# Patient Record
Sex: MALE | Race: WHITE
[De-identification: names, ages, dates, MRNs, and addresses within clinical notes are randomized per-mention and may not be internally consistent; named-entity substitution may affect disease eponyms.]

---

## 2018-06-24 ENCOUNTER — HOSPITAL ENCOUNTER (OUTPATIENT)
Dept: HOSPITAL 97 - ER | Age: 68
Setting detail: OBSERVATION
LOS: 2 days | Discharge: HOME | End: 2018-06-26
Attending: HOSPITALIST | Admitting: HOSPITALIST
Payer: COMMERCIAL

## 2018-06-24 VITALS — BODY MASS INDEX: 34.9 KG/M2

## 2018-06-24 DIAGNOSIS — K92.1: Primary | ICD-10-CM

## 2018-06-24 DIAGNOSIS — K21.9: ICD-10-CM

## 2018-06-24 DIAGNOSIS — K76.0: ICD-10-CM

## 2018-06-24 DIAGNOSIS — K64.8: ICD-10-CM

## 2018-06-24 DIAGNOSIS — K44.9: ICD-10-CM

## 2018-06-24 DIAGNOSIS — K57.90: ICD-10-CM

## 2018-06-24 DIAGNOSIS — I25.10: ICD-10-CM

## 2018-06-24 DIAGNOSIS — E66.9: ICD-10-CM

## 2018-06-24 DIAGNOSIS — N40.0: ICD-10-CM

## 2018-06-24 DIAGNOSIS — Z79.1: ICD-10-CM

## 2018-06-24 DIAGNOSIS — Z95.5: ICD-10-CM

## 2018-06-24 DIAGNOSIS — I10: ICD-10-CM

## 2018-06-24 LAB
ALBUMIN SERPL BCP-MCNC: 3.5 G/DL (ref 3.4–5)
ALP SERPL-CCNC: 63 U/L (ref 45–117)
ALT SERPL W P-5'-P-CCNC: 40 U/L (ref 12–78)
AST SERPL W P-5'-P-CCNC: 17 U/L (ref 15–37)
BUN BLD-MCNC: 23 MG/DL (ref 7–18)
GLUCOSE SERPLBLD-MCNC: 94 MG/DL (ref 74–106)
HCT VFR BLD CALC: 41.8 % (ref 39.6–49)
HCT VFR BLD CALC: 43.7 % (ref 39.6–49)
INR BLD: 1.04
LIPASE SERPL-CCNC: 135 U/L (ref 73–393)
LYMPHOCYTES # SPEC AUTO: 1.3 K/UL (ref 0.7–4.9)
MAGNESIUM SERPL-MCNC: 2.2 MG/DL (ref 1.8–2.4)
MCH RBC QN AUTO: 30.7 PG (ref 27–35)
MCV RBC: 91.8 FL (ref 80–100)
PMV BLD: 9.4 FL (ref 7.6–11.3)
POTASSIUM SERPL-SCNC: 4.1 MMOL/L (ref 3.5–5.1)
RBC # BLD: 4.76 M/UL (ref 4.33–5.43)

## 2018-06-24 PROCEDURE — 96368 THER/DIAG CONCURRENT INF: CPT

## 2018-06-24 PROCEDURE — 74177 CT ABD & PELVIS W/CONTRAST: CPT

## 2018-06-24 PROCEDURE — 84100 ASSAY OF PHOSPHORUS: CPT

## 2018-06-24 PROCEDURE — 80076 HEPATIC FUNCTION PANEL: CPT

## 2018-06-24 PROCEDURE — 85018 HEMOGLOBIN: CPT

## 2018-06-24 PROCEDURE — 86850 RBC ANTIBODY SCREEN: CPT

## 2018-06-24 PROCEDURE — 36415 COLL VENOUS BLD VENIPUNCTURE: CPT

## 2018-06-24 PROCEDURE — 80053 COMPREHEN METABOLIC PANEL: CPT

## 2018-06-24 PROCEDURE — 85610 PROTHROMBIN TIME: CPT

## 2018-06-24 PROCEDURE — 96365 THER/PROPH/DIAG IV INF INIT: CPT

## 2018-06-24 PROCEDURE — 84484 ASSAY OF TROPONIN QUANT: CPT

## 2018-06-24 PROCEDURE — 99285 EMERGENCY DEPT VISIT HI MDM: CPT

## 2018-06-24 PROCEDURE — 86900 BLOOD TYPING SEROLOGIC ABO: CPT

## 2018-06-24 PROCEDURE — 85014 HEMATOCRIT: CPT

## 2018-06-24 PROCEDURE — 83690 ASSAY OF LIPASE: CPT

## 2018-06-24 PROCEDURE — 71045 X-RAY EXAM CHEST 1 VIEW: CPT

## 2018-06-24 PROCEDURE — 93005 ELECTROCARDIOGRAM TRACING: CPT

## 2018-06-24 PROCEDURE — 86901 BLOOD TYPING SEROLOGIC RH(D): CPT

## 2018-06-24 PROCEDURE — 81003 URINALYSIS AUTO W/O SCOPE: CPT

## 2018-06-24 PROCEDURE — 80048 BASIC METABOLIC PNL TOTAL CA: CPT

## 2018-06-24 PROCEDURE — 85025 COMPLETE CBC W/AUTO DIFF WBC: CPT

## 2018-06-24 PROCEDURE — 83735 ASSAY OF MAGNESIUM: CPT

## 2018-06-24 PROCEDURE — 85730 THROMBOPLASTIN TIME PARTIAL: CPT

## 2018-06-24 RX ADMIN — SODIUM CHLORIDE SCH: 0.9 INJECTION, SOLUTION INTRAVENOUS at 21:00

## 2018-06-24 RX ADMIN — SODIUM CHLORIDE SCH MLS: 0.9 INJECTION, SOLUTION INTRAVENOUS at 22:04

## 2018-06-24 RX ADMIN — Medication SCH ML: at 22:06

## 2018-06-24 NOTE — EDPHYS
Physician Documentation                                                                           

 [facility]                                                                

Name: Marlen Lee                                                                              

Age: 67 yrs                                                                                       

Sex: Male                                                                                         

: 1950                                                                                   

MRN: [id number]                                                                                   

Arrival Date: 2018                                                                          

Time: 15:27                                                                                       

Account#: [id number]                                                                            

Bed 8                                                                                             

Private MD: Kirk Pacheco B                                                                     

ED Physician Lance Martinez                                                                      

HPI:                                                                                              

                                                                                             

15:56 This 67 yrs old  Male presents to ER via Ambulatory with complaints of Rectal  cp  

      Bleeding.                                                                                   

15:56 The patient presents to the emergency department with bleeding from the rectum/anus.    cp  

      Onset: The symptoms/episode began/occurred this morning. Associate signs and symptoms:      

      Pertinent negatives: abdominal pain, constipation, diarrhea, fever, vomiting. Patient       

      reports 4 episodes of painless rectal bleeding since this morning. Blood appears dark       

      colored.                                                                                    

                                                                                                  

Historical:                                                                                       

- Allergies:                                                                                      

15:39 NKA;                                                                                    aa5 

- Home Meds:                                                                                      

20:04 aspirin 81 mg Oral chew 1 tab once daily [Active]; brimonidine 0.2% sol fraga 2 drops  lp1 

      to each eye twice a day [Active]; Centrum Oral [Active]; dicyclomine 20 mg Oral tab as      

      needed [Active]; finasteride 5 mg Oral tab 1 tab once daily [Active]; gabapentin 600 mg     

      Oral tab as needed [Active]; lansoprozole 30 mg as needed [Active]; metformin 500 mg        

      Oral tr24 1 tab twice a day [Active]; Probiotic Oral [Active]; Repatha SureClick 140        

      mg/mL subcutaneous pnij 3 mL once moly [Active]; tamsulosin 0.4 mg Oral cp24 1 cap          

      Every other day [Active]; telmisartan-amlodipine 80-10 mg Oral tab 1 tab once daily         

      [Active]; tindol maleate [Active]; zolpidem 10 mg Oral tab 1 tab as needed [Active];        

- PMHx:                                                                                           

15:39 Diverticulitis; GERD; Glaucoma; Hypertension; Kidney stones; prediabetes;               aa5 

- PSHx:                                                                                           

15:39 cardiac stent; cataract- richard eyes;                                                      aa5 

                                                                                                  

- Immunization history:: Adult Immunizations unknown.                                             

- Social history:: Smoking status: Patient/guardian denies using tobacco.                         

- Ebola Screening: : No symptoms or risks identified at this time.                                

                                                                                                  

                                                                                                  

ROS:                                                                                              

16:00 Constitutional: Negative for body aches, chills, fever, poor PO intake.                 cp  

16:00 Eyes: Negative for injury, pain, redness, and discharge.                                cp  

16:00 ENT: Negative for drainage from ear(s), ear pain, sore throat, difficulty swallowing,       

      difficulty handling secretions.                                                             

16:00 Cardiovascular: Negative for chest pain, edema.                                             

16:00 Respiratory: Negative for cough, shortness of breath, wheezing.                             

16:00 Abdomen/GI: Positive for rectal bleeding, Negative for abdominal pain, vomiting,            

      diarrhea, constipation.                                                                     

16:00 Back: Negative for pain at rest, pain with movement, radiated pain.                         

16:00 : Negative for urinary symptoms, testicular pain                                          

16:00 Skin: Negative for cellulitis, rash.                                                        

16:00 Neuro: Negative for altered mental status, headache, syncope, near syncope, weakness.       

16:00 All other systems are negative.                                                             

                                                                                                  

Exam:                                                                                             

16:08 Constitutional: The patient appears in no acute distress, alert, awake, comfortable,    cp  

      non-diaphoretic, non-toxic, well developed, well nourished.                                 

16:08 Head/Face:  Normocephalic, atraumatic.                                                  cp  

17:38 ECG was reviewed by the Attending Physician.                                              

                                                                                                  

Vital Signs:                                                                                      

15:39  / 74; Pulse 65; Resp 16 S; Temp 98.2(TE); Pulse Ox 95% on R/A; Weight 111.13 kg  aa5 

      (R); Height 5 ft. 8 in. (172.72 cm) (R); Pain 3/10;                                         

16:29  / 75; Pulse 61; Resp 16 S; Pulse Ox 97% on R/A; Pain 0/10;                       jl7 

19:17  / 76; Pulse 59; Resp 18; Temp 98.0(O); Pulse Ox 98% on R/A;                      lp1 

20:20  / 61; Pulse 60; Resp 18; Pulse Ox 99% on R/A;                                    lp1 

15:39 Body Mass Index 37.25 (111.13 kg, 172.72 cm)                                            aa5 

                                                                                                  

MDM:                                                                                              

15:43 Patient medically screened.                                                             cp  

18:35 Data reviewed: vital signs, nurses notes, lab test result(s), EKG, radiologic studies,  cp  

      CT scan, plain films.                                                                       

18:35 Test interpretation: by ED physician or midlevel provider: ECG, plain radiologic        cp  

      studies.                                                                                    

18:44 Physician consultation: Azael Benson MD was called at 18:30, regarding patient's      cp  

      condition, left message on voicemail.                                                       

19:07 Physician consultation: Azael Benson MD was called at 19:07, regarding consult,       cp  

      patient's condition, left message on voicemail.                                             

19:32 Physician consultation: Isiah Crow MD was called at 19:25, was contacted at 19:25,     cp  

      regarding consult, patient's condition, would like admission per Dr. Eliana Espinoza MD.      

                                                                                                  

                                                                                             

16:12 Order name: Basic Metabolic Panel; Complete Time: 17:06                                 cp  

                                                                                             

17:06 Interpretation: Normal except: BUN 23; GFR 84.                                          cp  

                                                                                             

16:12 Order name: CBC with Diff; Complete Time: 17:06                                         cp  

                                                                                             

17:07 Interpretation: Normal except: MN% 13.5.                                                  

                                                                                             

16:12 Order name: LFT's; Complete Time: 17:06                                                   

                                                                                             

16:12 Order name: Magnesium; Complete Time: 17:06                                             cp  

                                                                                             

16:12 Order name: PT-INR; Complete Time: 17:06                                                  

                                                                                             

16:12 Order name: Ptt, Activated; Complete Time: 17:06                                        cp  

                                                                                             

16:12 Order name: Troponin (emerg Dept Use Only); Complete Time: 17:06                        cp  

                                                                                             

16:12 Order name: XRAY Chest (1 view); Complete Time: 18:30                                   cp  

                                                                                             

16:12 Order name: Type And Screen; Complete Time: 17:17                                       cp  

                                                                                             

17:17 Interpretation: Reviewed.                                                                 

                                                                                             

16:12 Order name: Lipase; Complete Time: 17:06                                                cp  

                                                                                             

17:59 Order name: ABO/RH no charge; Complete Time: 18:30                                      EDMS

                                                                                             

19:20 Order name: Hemoglobin; Complete Time: 20:03                                              

                                                                                             

20:03 Interpretation: Within normal limits.                                                     

                                                                                             

19:20 Order name: Hematocrit; Complete Time: 20:03                                            fc  

                                                                                             

19:23 Order name: Urine Dipstick--Ancillary (enter results); Complete Time: 20:03             rg2 

                                                                                             

16:12 Order name: EKG; Complete Time: 16:12                                                   cp  

                                                                                             

16:12 Order name: Cardiac monitoring; Complete Time: 16:23                                    cp  

                                                                                             

16:12 Order name: EKG - Nurse/Tech; Complete Time: 18:47                                        

                                                                                             

16:12 Order name: IV Saline Lock; Complete Time: 16:23                                          

                                                                                             

16:12 Order name: Labs collected and sent; Complete Time: 16:23                                 

                                                                                             

16:12 Order name: O2 Per Protocol; Complete Time: 16:23                                         

                                                                                             

16:12 Order name: O2 Sat Monitoring; Complete Time: 16:23                                       

                                                                                             

16:12 Order name: Urine Dipstick-Ancillary (obtain specimen); Complete Time: 19:22              

                                                                                             

16:12 Order name: CT Abd/Pelvis - W/Contrast; Complete Time: 18:30                              

                                                                                             

18:30 Interpretation: Report reviewed.                                                          

                                                                                             

18:56 Order name: NPO; Complete Time: 19:17                                                     

                                                                                             

19:40 Order name: CONS Physician Consult                                                      EDMS

                                                                                                  

EC:38 Rate is 54 beats/min. Rhythm is regular. NJ interval is normal. QRS interval is normal. cp  

      QT interval is normal. No ST changes noted. Interpreted by me. Reviewed by me.              

                                                                                                  

Administered Medications:                                                                         

19:25 Drug: metroNIDAZOLE 500 mg Volume: 100 ml; Route: IVPB; Infused Over: 30 mins; Site:    lp1 

      right antecubital;                                                                          

20:26 Follow up: IV Status: Completed infusion                                                lp1 

19:25 Drug: Cipro 400 mg Volume: 200 ml; Route: IVPB; Infused Over: 60 mins; Site: right      lp1 

      antecubital;                                                                                

20:26 Follow up: IV Status: Completed infusion                                                lp1 

                                                                                                  

                                                                                                  

Disposition:                                                                                      

                                                                                             

16:17 Co-signature as Attending Physician, Lance Martinez MD I agree with the assessment and  [facility] 

      plan of care.                                                                               

                                                                                                  

Disposition:                                                                                      

18 19:35 Hospitalization ordered by Eliana Espinoza for Observation. Preliminary             

  diagnosis is Diverticulosis of large intestine without perforation or abscess                   

  with bleeding.                                                                                  

- Bed requested for Telemetry/MedSurg (observation).                                              

- Status is Observation.                                                                      lp1 

- Condition is Stable.                                                                            

- Problem is new.                                                                                 

- Symptoms are unchanged.                                                                         

UTI on Admission? No                                                                              

                                                                                                  

                                                                                                  

                                                                                                  

Signatures:                                                                                       

Dispatcher [employer]                           EDMS                                                 

Junie Schmidt RN RN mw Anderson, Corey, MD MD cha Calderon, Audri, RN                     RN   aa5                                                  

Lizzeth Rai RN                         RN   lp1                                                  

Page, Lance, PA                         PA   cp                                                   

                                                                                                  

Corrections: (The following items were deleted from the chart)                                    

                                                                                             

19:48 19:35 Hospitalization Ordered by Eliana Espinoza MD for Observation. Preliminary          mw  

      diagnosis is Diverticulosis of large intestine without perforation or abscess with          

      bleeding. Bed requested for Telemetry/MedSurg (observation). Status is Observation.         

      Condition is Stable. Problem is new. Symptoms are unchanged. UTI on Admission? No. cp       

20:07 19:48 2018 19:35 Hospitalization Ordered by Eliana Espinoza MD for Observation.     mw  

      Preliminary diagnosis is Diverticulosis of large intestine without perforation or           

      abscess with bleeding. Bed requested for Telemetry/MedSurg (observation). Status is         

      Observation. Condition is Stable. Problem is new. Symptoms are unchanged. UTI on            

      Admission? No. mw                                                                           

20:41 20:07 2018 19:35 Hospitalization Ordered by Eliana Espinoza MD for Observation.     lp1 

      Preliminary diagnosis is Diverticulosis of large intestine without perforation or           

      abscess with bleeding. Bed requested for Telemetry/MedSurg (observation). Status is         

      Observation. Condition is Stable. Problem is new. Symptoms are unchanged. UTI on            

      Admission? No. mw                                                                           

                                                                                                  

**************************************************************************************************

## 2018-06-24 NOTE — ER
Nurse's Notes                                                                                     

 [facility]                                                                

Name: Marlen Lee                                                                              

Age: 67 yrs                                                                                       

Sex: Male                                                                                         

: 1950                                                                                   

MRN: [id number]                                                                                   

Arrival Date: 2018                                                                          

Time: 15:27                                                                                       

Account#: [id number]                                                                            

Bed 8                                                                                             

Private MD: Kirk Pacheco B                                                                     

Diagnosis: Diverticulosis of large intestine without perforation or abscess with bleeding         

                                                                                                  

Presentation:                                                                                     

                                                                                             

15:37 Presenting complaint: Patient states: rectal bleeding that started as bright red this   aa5 

      morning around 1000 and it's now dark red. Pt denies nausea, denies vomiting. Pt states     

      "I know I have an ulcer but it's never bleed before". Transition of care: patient was       

      not received from another setting of care. Onset of symptoms was 2018. Risk            

      Assessment: Do you want to hurt yourself or someone else? Patient reports no desire to      

      harm self or others. Initial Sepsis Screen: Does the patient meet any 2 criteria? No.       

      Patient's initial sepsis screen is negative. Does the patient have a suspected source       

      of infection? No. Patient's initial sepsis screen is negative. Care prior to arrival:       

      None.                                                                                       

15:37 Method Of Arrival: Ambulatory                                                           aa5 

15:37 Acuity: ADRIANNA 3                                                                           aa5 

                                                                                                  

Historical:                                                                                       

- Allergies:                                                                                      

15:39 NKA;                                                                                    aa5 

- Home Meds:                                                                                      

20:04 aspirin 81 mg Oral chew 1 tab once daily [Active]; brimonidine 0.2% sol fraga 2 drops  lp1 

      to each eye twice a day [Active]; Centrum Oral [Active]; dicyclomine 20 mg Oral tab as      

      needed [Active]; finasteride 5 mg Oral tab 1 tab once daily [Active]; gabapentin 600 mg     

      Oral tab as needed [Active]; lansoprozole 30 mg as needed [Active]; metformin 500 mg        

      Oral tr24 1 tab twice a day [Active]; Probiotic Oral [Active]; Repatha SureClick 140        

      mg/mL subcutaneous pnij 3 mL once moly [Active]; tamsulosin 0.4 mg Oral cp24 1 cap          

      Every other day [Active]; telmisartan-amlodipine 80-10 mg Oral tab 1 tab once daily         

      [Active]; tindol maleate [Active]; zolpidem 10 mg Oral tab 1 tab as needed [Active];        

- PMHx:                                                                                           

15:39 Diverticulitis; GERD; Glaucoma; Hypertension; Kidney stones; prediabetes;               aa5 

- PSHx:                                                                                           

15:39 cardiac stent; cataract- richard eyes;                                                      aa5 

                                                                                                  

- Immunization history:: Adult Immunizations unknown.                                             

- Social history:: Smoking status: Patient/guardian denies using tobacco.                         

- Ebola Screening: : No symptoms or risks identified at this time.                                

                                                                                                  

                                                                                                  

Screening:                                                                                        

15:45 Abuse screen: Denies threats or abuse. Denies injuries from another. Nutritional        jl7 

      screening: No deficits noted. Tuberculosis screening: No symptoms or risk factors           

      identified. Fall Risk IV access (20 points). Total Smith Fall Scale indicates No Risk       

      (0-24 pts).                                                                                 

                                                                                                  

Assessment:                                                                                       

15:45 General: Appears in no apparent distress. uncomfortable, Behavior is calm, cooperative, jl7 

      appropriate for age. Pain: Denies pain. Neuro: Level of Consciousness is awake, alert,      

      obeys commands, Oriented to person, place, time, situation. Cardiovascular: Heart tones     

      S1 S2 present Patient's skin is warm and dry. Respiratory: Airway is patent Respiratory     

      effort is even, unlabored, Respiratory pattern is regular, symmetrical. GI: Abdomen is      

      round non-distended, Bowel sounds present X 4 quads. Abd is soft and non tender X 4         

      quads. Reports rectal bleeding, since since this morning. : No signs and/or symptoms      

      were reported regarding the genitourinary system. EENT: No signs and/or symptoms were       

      reported regarding the EENT system. Derm: Skin is pink, warm \T\ dry.                       

19:17 Reassessment: Patient is alert, oriented x 3, equal unlabored respirations, skin        lp1 

      warm/dry/pink. Reassessment: Verbal order from provider for repeat Hgb/Hct. GI: Reports     

      rectal bleeding, States up to bathroom multiple times here in ED, "I feel like I'm          

      getting weaker";.                                                                           

20:21 Reassessment: Patient appears in no apparent distress at this time. Patient and/or      lp1 

      family updated on plan of care and expected duration. Pain level reassessed. Patient is     

      alert, oriented x 3, equal unlabored respirations, skin warm/dry/pink. Patient and wife     

      aware of admission.                                                                         

20:25 Reassessment: Attempt to call report at this time.                                      lp1 

                                                                                                  

Vital Signs:                                                                                      

15:39  / 74; Pulse 65; Resp 16 S; Temp 98.2(TE); Pulse Ox 95% on R/A; Weight 111.13 kg  aa5 

      (R); Height 5 ft. 8 in. (172.72 cm) (R); Pain 3/10;                                         

16:29  / 75; Pulse 61; Resp 16 S; Pulse Ox 97% on R/A; Pain 0/10;                       jl7 

19:17  / 76; Pulse 59; Resp 18; Temp 98.0(O); Pulse Ox 98% on R/A;                      lp1 

20:20  / 61; Pulse 60; Resp 18; Pulse Ox 99% on R/A;                                    lp1 

15:39 Body Mass Index 37.25 (111.13 kg, 172.72 cm)                                            aa5 

                                                                                                  

ED Course:                                                                                        

15:27 Patient arrived in ED.                                                                  mr  

15:28 Kirk Pacheco MD is Private Physician.                                                mr  

15:38 Triage completed.                                                                       aa5 

15:39 Arm band placed on.                                                                     aa5 

15:42 Luis Eduardo Lewis RN is Primary Nurse.                                                      jl7 

15:42 Lance Campos PA is PHCP.                                                                cp  

15:43 Lance Martinez MD is Attending Physician.                                             cp  

15:45 Patient has correct armband on for positive identification. Placed in gown. Bed in low  jl7 

      position. Call light in reach. Side rails up X 1. Cardiac monitor on. Pulse ox on. NIBP     

      on. Warm blanket given.                                                                     

15:50 Served as a chaperone during rectal exam.                                               jl7 

15:55 Initial lab(s) drawn, by me, sent to lab. Inserted saline lock: 20 gauge in right       jl7 

      antecubital area, using aseptic technique. Blood collected.                                 

16:28 X-ray completed. Portable x-ray completed in exam room. Patient tolerated procedure     la2 

      well.                                                                                       

16:30 XRAY Chest (1 view) In Process Unspecified.                                             EDMS

18:17 CT completed. Patient moved to CT via wheelchair. Patient moved back from CT.           cw1 

18:18 CT Abd/Pelvis - W/Contrast In Process Unspecified.                                      EDMS

19:05 Urine collected: clean catch specimen.                                                  cc  

19:10 Report given to MILKA Moreau.                                                              jl7 

19:23 Primary Nurse role handed off by Luis Eduardo Lewis RN                                       rg2 

19:24 Lizzeth Rai RN is Primary Nurse.                                                       lp1 

19:35 Eliana Espinoza MD is Hospitalizing Provider.                                           cp  

20:02 Patient admitted, IV remains in place.                                                  lp1 

                                                                                                  

Administered Medications:                                                                         

19:25 Drug: metroNIDAZOLE 500 mg Volume: 100 ml; Route: IVPB; Infused Over: 30 mins; Site:    lp1 

      right antecubital;                                                                          

20:26 Follow up: IV Status: Completed infusion                                                lp1 

19:25 Drug: Cipro 400 mg Volume: 200 ml; Route: IVPB; Infused Over: 60 mins; Site: right      lp1 

      antecubital;                                                                                

20:26 Follow up: IV Status: Completed infusion                                                lp1 

                                                                                                  

                                                                                                  

Outcome:                                                                                          

19:35 Decision to Hospitalize by Provider.                                                    cp  

20:04 Condition: stable                                                                       lp1 

20:04 Instructed on the need for admit.                                                       lp1 

20:33 Admitted to Tele via wheelchair, room 415, with chart, Report called to  Nora Zepeda RN lp1 

20:41 Patient left the ED.                                                                    lp1 

                                                                                                  

Signatures:                                                                                       

Dispatcher [employer]                           EDMS                                                 

Ayo An                               rg2                                                  

Nora Peterson                                mr                                                   

[place], RN                     RN   aa5                                                  

Hillary Camarena                             cw1                                                  

Ivory Haynes Laura RN                         RN   lp1                                                  

Lance Campos, PA                         PA   Luis Eduardo Bentley RN                        RN   jl7                                                  

India Herrera                               la2                                                  

                                                                                                  

Corrections: (The following items were deleted from the chart)                                    

15:39 15:37 Presenting complaint: Patient states: rectal bleeding that started as bright red  aa5 

      this morning around 1000 and it's now dark red. Pt denies nausea, denies vomiting. Pt       

      states "I know I have an ulcer" aa5                                                         

                                                                                                  

**************************************************************************************************

## 2018-06-24 NOTE — RAD REPORT
EXAM DESCRIPTION:  RAD - Chest Single View - 6/24/2018 4:29 pm

 

CLINICAL HISTORY:  Cough, shortness of breath

 

COMPARISON:  January 2018

 

TECHNIQUE:  AP portable chest image was obtained 1617 hours .

 

FINDINGS:  Lungs are clear. Heart and vasculature are normal. No measurable pleural effusion and no p
neumothorax. No gross bony abnormality seen. No acute aortic findings suspected.

 

IMPRESSION:  No acute cardiopulmonary process.

 

No significant change from comparison.

## 2018-06-24 NOTE — RAD REPORT
EXAM DESCRIPTION:  CT - Abdomen   Pelvis W Contrast - 6/24/2018 6:17 pm

 

CLINICAL HISTORY:  Rectal bleeding, history of diverticulitis.

 

COMPARISON:  CT October 2017

 

TECHNIQUE:  Biphasic, helical CT imaging of the abdomen and pelvis was performed following 100 ml non
-ionic IV contrast. Oral contrast was given.

 

All CT scans are performed using dose optimization technique as appropriate and may include automated
 exposure control or mA/KV adjustment according to patient size.

 

FINDINGS:  No suspicious findings in the lung bases.

 

The liver, spleen, and pancreas show no suspicious findings. Liver shows diffuse fatty infiltration. 
No gallbladder or biliary tree abnormality.

 

Symmetric renal function is seen with no hydronephrosis or suspicious renal mass. No pyelonephritis o
r acute renal parenchymal process. No adrenal abnormalities. Urinary bladder is contracted limiting d
etail. No significant prostate or seminal vesicle finding.

 

No dilated bowel loops or bowel wall thickening. Small duodenal diverticulum. No appendicitis. There 
is prominent diverticulosis in the sigmoid colon but no diverticulitis. No mass or focal abnormality 
as a source for rectal bleeding. No free air, free fluid or inflammatory stranding.  No mass or bulky
 lymphadenopathy. Small fat only umbilical hernia is present. Patient has a small fat only left ingui
nal hernia as well as a fatty hiatal hernia.

 

Advanced disc and bony degenerative change present. No acute or pathologic bone process.

 

 

IMPRESSION:  Diverticulosis without diverticulitis, mass or other acute colon process. No abnormality
 identified as a source for GI bleed.  No appendicitis findings. Full colon assessment is limited due
 to incomplete distension.

 

Diffuse fatty infiltration of the liver.

 

No acute  finding. Additional nonacute findings detailed in the body of the report. Overall no sign
ificant change from comparison.

## 2018-06-25 LAB
ALBUMIN SERPL BCP-MCNC: 3.2 G/DL (ref 3.4–5)
ALP SERPL-CCNC: 53 U/L (ref 45–117)
ALT SERPL W P-5'-P-CCNC: 35 U/L (ref 12–78)
AST SERPL W P-5'-P-CCNC: 18 U/L (ref 15–37)
BUN BLD-MCNC: 17 MG/DL (ref 7–18)
GLUCOSE SERPLBLD-MCNC: 104 MG/DL (ref 74–106)
HCT VFR BLD CALC: 37.5 % (ref 39.6–49)
HCT VFR BLD CALC: 38.2 % (ref 39.6–49)
HCT VFR BLD CALC: 40 % (ref 39.6–49)
HCT VFR BLD CALC: 41.4 % (ref 39.6–49)
LYMPHOCYTES # SPEC AUTO: 1.1 K/UL (ref 0.7–4.9)
LYMPHOCYTES # SPEC AUTO: 1.2 K/UL (ref 0.7–4.9)
MAGNESIUM SERPL-MCNC: 1.9 MG/DL (ref 1.8–2.4)
MCH RBC QN AUTO: 30.4 PG (ref 27–35)
MCH RBC QN AUTO: 30.7 PG (ref 27–35)
MCV RBC: 92.9 FL (ref 80–100)
MCV RBC: 93.2 FL (ref 80–100)
PMV BLD: 8.9 FL (ref 7.6–11.3)
PMV BLD: 8.9 FL (ref 7.6–11.3)
POTASSIUM SERPL-SCNC: 4.1 MMOL/L (ref 3.5–5.1)
RBC # BLD: 4.03 M/UL (ref 4.33–5.43)
RBC # BLD: 4.29 M/UL (ref 4.33–5.43)

## 2018-06-25 RX ADMIN — SODIUM CHLORIDE SCH MLS: 0.9 INJECTION, SOLUTION INTRAVENOUS at 17:52

## 2018-06-25 RX ADMIN — Medication SCH ML: at 08:24

## 2018-06-25 RX ADMIN — SODIUM CHLORIDE SCH MLS: 0.9 INJECTION, SOLUTION INTRAVENOUS at 08:24

## 2018-06-25 RX ADMIN — SODIUM CHLORIDE SCH MLS: 0.9 INJECTION, SOLUTION INTRAVENOUS at 08:23

## 2018-06-25 RX ADMIN — Medication SCH: at 21:00

## 2018-06-25 NOTE — CON
Date of Consultation:  06/25/2018



Brief History Of Present Illness:  The patient is a 67-year-old  male, who presents to [facility] with rectal bleeding starting approximately 24 hours ago.  He states, there was a large quantity 
of blood.  It was predominantly dark and melanotic.  He had been taking Aleve for the last few hours,
 more so than usual.  He had a colonoscopy in the past, which revealed internal hemorrhoids and diver
ticulosis.  He does take a baby aspirin as well.  His vital signs were good.  On admission, his hemog
lobin was checked and found to be in the normal range.  He has no pain, no nausea, no vomiting, no ot
her history of rectal bleeding.  His last colonoscopy was this year as described above.  He has had n
o sick contacts, no recent travel, no new food exposures.



Past Medical History:  Significant for hypertension, glaucoma, GERD, hiatal hernia, diverticulosis, n
ephrolithiasis, BPH, and coronary artery disease.



Past Surgical History:  Coronary stents.



Family History:  Reviewed and noncontributory.



Social History:  He denies smoking, alcohol, or recreational drug use.



Allergies:  TO NO KNOWN DRUG ALLERGIES.



Home Medications:  Include aspirin, Flomax, Ambien, Protonix, telmisartan, amlodipine, timolol.



Review of Systems:

A 10-point review of systems other than HPI, denies.



Physical Examination:

Vital Signs:  At the time of my examination, his BMI is 35.4.  His blood pressure 146/70, heart rate 
is 54, respiratory rate 20, and temperature 97.3. 

General:  He is awake, alert, oriented. 

Psychiatric:  He is appropriate and conversive. 

HEENT:  Normocephalic.  Sclerae anicteric.  Mucous membranes are moist.  Oropharynx clear. 

Neck:  Supple.  No JVD. 

Chest:  Normal expansion and excursion. 

Cardiovascular:  Regular rate and rhythm. 

Pulmonary:  Clear to auscultation bilaterally. 

Abdomen:  Soft, nontender, nondistended.  No rebound.  No guarding.  No focal peritonitis.  His abdom
en is generally obese. 

Rectal:  Shows some evidence of dark melanotic blood streaking.  He does have internal hemorrhoids in
 both lateral pillars, right anterior posterior and left lateral pillar.  There is no bright red bloo
d on examination.  There are no external hemorrhoids appreciated.  It is nontender to examination and
 tone is generally good. 

Extremities:  No clubbing, cyanosis, or edema. 

Skin:  Warm and dry.



Laboratory Data:  Reveals a white blood count of 4.3, hemoglobin is 13.2 over hematocrit of 40.0, and
 platelet count is 199.  His sodium was 139, potassium 4.1, chloride 107, carbon dioxide 28, BUN 23, 
creatinine 0.9, glucose is 94, calcium 8.7, magnesium 2.2, total bilirubin 0.4.  Direct troponin 0.1.
  AST 17, ALT 40, and alkaline phosphatase is 63.  UA is essentially negative.  He had imaging perfor
med, which included a CT scan of the abdomen and pelvis, which showed evidence of diverticulosis with
out diverticulitis.  No other acute findings for GI bleed.  No appendicitis findings.  Diffuse fatty 
infiltration of the liver.  He does have an umbilical hernia and a small fat-containing left inguinal
 hernia as well as a fatty hiatal hernia.  He had a chest x-ray performed as well, which is officiall
y read as no acute cardiopulmonary process.



Assessment And Plan:  This is a 67-year-old male, who presents with gastrointestinal bleeding

1.IV fluid hydration.

2.The patient had type and screen.

3.I recommend a GI consultation for possible endoscopy.  Dr. Edgar has been consulted to see the maryann
ent.

4.The patient does have hemorrhoids, however, this is unlikely the source of the acute bleed as it i
s melanotic and not bright red.  However, it is not completely ruled out at this time.  Therefore, I 
will wait until endoscopy findings to proceed with further examination and evaluation.  I have explai
manju the risks, benefits, 

and alternatives of the above stated plan.  The patient agrees to proceed as indicated.





ALEXANDER/PHIL

DD:  06/25/2018 08:56:40Voice ID:  [id number]

DT:  06/25/2018 12:57:05Report ID:  [id number]

## 2018-06-25 NOTE — P.HP
Certification for Inpatient


Patient admitted to: Observation


With expected LOS: <2 Midnights


Patient will require the following post-hospital care: None


Practitioner: I am a practitioner with admitting privileges, knowledge of 

patient current condition, hospital course, and medical plan of care.


Services: Services provided to patient in accordance with Admission 

requirements found in Title 42 Section 412.3 of the Code of Federal Regulations





Patient History


Date of Service: 06/24/18


Reason for admission: Rectal bleeding


History of Present Illness: 





Patient is a 67-year-old gentleman who came into the hospital with rectal 

bleeding.  Patient states it has been going on for the last 6-8 hr.  He states 

that is a large quantity of blood.  He denies any melanotic stools.  He has 

been taking Aleve for the last few weeks little more so than normal.  He thinks 

about 1 every other day.  He could have collagenous colitis or a hemorrhoidal 

bleed as he has had a colonoscopy in the past which revealed internal 

hemorrhoids.  He takes a baby aspirin daily as well.  His vital signs are 

stable and he has no orthostatics.  His hemoglobin on repeat has not drop 

significantly.  He does not have any tachyarrhythmia.  We did not have GI on 

call but we did contact Dr. Crow who is available for any procedures as 

needed.  Patient sees Dr. Edgar will be consulted as well.


Allergies





No Known Allergies Allergy (Unverified 09/10/12 11:36)


 





Home Medications: 








Aspirin [Aspirin EC 81 MG] 81 mg PO DAILY 10/06/17 


Tamsulosin [Flomax*] 0.4 mg PO SEECOM 10/06/17 


Zolpidem Tartrate 10 mg PO BEDTIME PRN 10/06/17 


Pantoprazole [Protonix  Tab] 40 mg PO DAILY #30 tab 10/07/17 


Telmisartan/Amlodipine [Telmisartan-Amlodipine 80-10] 1 tab PO DAILY 06/24/18 


Timolol 0.5% Opth [Timoptic 0.5% Opth*] 1 drop EACH EYE BID 06/24/18 








- Past Medical/Surgical History


Has patient received pneumonia vaccine in the past: Yes


Diabetic: No


-: History of diverticulitis


-: GERD with hiatal hernia


-: Hypertension


-: Glaucoma


-: Nephrolithiasis


-: BPH


-: Coronary artery disease, previous stent


Past Surgical History: Patient denies surgical history


-: cardiac stent


Psychosocial/ Personal History: The patient is .





- Family History


  ** Mother


Medical History: Other (see notes)


Notes: Glaucoma, Chrons disease





- Social History


Smoking Status: Never smoker


Alcohol use: No


CD- Drugs: No


Caffeine use: Yes


Place of Residence: Home





Review of Systems


10-point ROS is otherwise unremarkable





Physical Examination





- Vital Signs


Temperature: 97.3 F


Blood Pressure: 146/70


Pulse: 57


Respirations: 20


Pulse Ox (%): 94





- Physical Exam


General: Alert, In no apparent distress, Oriented x3


HEENT: Atraumatic, PERRLA, Mucous membr. moist/pink, EOMI, Sclerae nonicteric


Neck: Supple, 2+ carotid pulse no bruit, No LAD, Without JVD or thyroid 

abnormality


Respiratory: Clear to auscultation bilaterally, Normal air movement


Cardiovascular: Regular rate/rhythm, Normal S1 S2, No murmurs


Gastrointestinal: Normal bowel sounds, Soft and benign, Non-distended, No 

tenderness


Musculoskeletal: No clubbing, No swelling, No tenderness


Integumentary: No rashes


Neurological: Normal gait, Normal speech, Normal strength at 5/5 x4 extr, 

Normal tone, Sensation intact, Cranial nerves 3-12 intact, Normal reflexes 2+, 

Normal affect


Lymphatics: No axilla or inguinal lymphadenopathy





- Studies


Laboratory Data (last 24 hrs)





06/24/18 19:20: Hgb 14.4, Hct 41.8


06/24/18 15:55: PT 12.3, INR 1.04, APTT 32.3


06/24/18 15:55: WBC 4.6, Hgb 14.6, Hct 43.7, Plt Count 192


06/24/18 15:55: Sodium 139, Potassium 4.1, BUN 23 H, Creatinine 0.90, Glucose 94

, Magnesium 2.2, Total Bilirubin 0.4, AST 17, ALT 40, Alkaline Phosphatase 63, 

Lipase 135








Assessment & Plan





- Problems (Diagnosis)


(1) Lower GI bleeding


Current Visit: Yes   Status: Acute   





(2) NSAID long-term use


Current Visit: Yes   Status: Acute   





(3) BPH (benign prostatic hyperplasia)


Current Visit: No   Status: Chronic   


Qualifiers: 


 





(4) CAD (coronary artery disease)


Current Visit: No   Status: Chronic   


Qualifiers: 


 





(5) Hypertension


Current Visit: No   Status: Chronic   


Qualifiers: 


 





(6) Obesity


Current Visit: No   Status: Chronic   


Qualifiers: 


 





- Plan





Plan:


1. Continue with IV hydration and PPI drip


2. Refrain from NSAID use


3. Continue with pain control


4. NPO


5. GI consultation in general surgery consultation


6. Serial H&H, and we will monitor electrolytes as well


7. GI and DVT prophylaxis


Discharge Plan: Home


Plan to discharge in: 48 Hours





- Advance Directives


Does patient have a Living Will: No


Does patient have a Durable POA for Healthcare: No





- Code Status/Comfort Care


Code Status Assessed: Yes


Code Status: Full Code


Critical Care: No


Time Spent Managing PTS Care (In Minutes): 50

## 2018-06-25 NOTE — EKG
Test Date:    2018-06-24               Test Time:    22:51:39

Technician:   RT BOX                                   

                                                     

MEASUREMENT RESULTS:                                       

Intervals:                                           

Rate:         48                                     

NJ:           164                                    

QRSD:         90                                     

QT:           480                                    

QTc:          428                                    

Axis:                                                

P:            63                                     

NJ:           164                                    

QRS:          -18                                    

T:            7                                      

                                                     

INTERPRETIVE STATEMENTS:                                       

                                                     

Marked sinus bradycardia

Minimal voltage criteria for LVH, may be normal variant

Abnormal ECG

Compared to ECG 06/24/2018 17:31:28

No significant changes



Electronically Signed On 06-25-18 06:51:16 CDT by Benja Casper

## 2018-06-25 NOTE — EKG
Test Date:    2018-06-24               Test Time:    17:31:28

Technician:   AMG                                    

                                                     

MEASUREMENT RESULTS:                                       

Intervals:                                           

Rate:         54                                     

UT:           154                                    

QRSD:         92                                     

QT:           446                                    

QTc:          422                                    

Axis:                                                

P:            53                                     

UT:           154                                    

QRS:          -27                                    

T:            18                                     

                                                     

INTERPRETIVE STATEMENTS:                                       

                                                     

Sinus bradycardia

Minimal voltage criteria for LVH, may be normal variant

Borderline ECG

Compared to ECG 10/06/2017 10:02:59

Left ventricular hypertrophy now present

Sinus rhythm no longer present

Left-axis deviation no longer present



Electronically Signed On 06-25-18 06:52:22 CDT by Benja Casper

## 2018-06-25 NOTE — PN
Date of Progress Note:  06/25/2018



Subjective:  The patient seen and examined, chart reviewed, and case discussed 
with RN and Dr. Crow and Dr. Edgar.  The patient is still having some lower 
GI bleed, bright red blood per rectum.  Denies any dizziness or lightheadedness.



Review of Systems:

Negative except as above.



Medications:  Reviewed.



Objective:  Vital Signs: Temperature 99, heart rate 75, blood pressure 126/60, 
respirations 18, and O2 95% on room air. 

General:  Awake, alert, oriented x3, in some mild distress.  Elderly male.  
Obese.  BMI 35. 

CV:  S1, S2.  No murmurs.  Regular rate and rhythm.  Peripheral pulses present. 

Respiratory:  Clear to auscultation bilaterally.  No wheezing.  No stridor.  No 
use of accessory muscles Gastrointestinal:  Abdomen is soft, nontender, 
nondistended.  Positive bowel sounds. 

Extremities:  No clubbing, cyanosis, edema. 

Neurologic:  Nonfocal.



Laboratory Data:  Sodium 138, potassium 4.1, chloride 107, CO2 28, BUN 17, 
creatinine 0.9, glucose 104, calcium 8, phosphorus 2.6, and magnesium 1.9.  WBC 
4.4, H and H 12.2, 37.5, and platelets 199.



Assessment:  A 67-year-old male with;

1.   Lower gastrointestinal bleed, may be secondary to diverticular bleed.  We 
will continue to monitor H and H.  GI and Surgery on the case.  No surgical 
intervention at this point.  We will advance to clear liquid diet.  GI 
recommends monitoring H and H overnight.  May possibly need scope in the a.m. 
depending on hemoglobin levels.

2.   Long-term nonsteroidal anti-inflammatory drugs use.  Counseled.

3.   Benign prostatic hyperplasia.  Resume home medications.

4.   Coronary artery disease, native artery and native heart without angina, 
stable

5.   Obesity, body mass index 35.

6.   Gastroesophageal reflux disease with hiatal hernia.  We will continue PPI.

7.   History of diverticulosis.

8.   Essential hypertension.  Resume home medications

Plan:  We will continue IV PPI.  Avoid NSAIDs.  Monitor H and H.





SA/MODL

DD:  06/25/2018 13:21:27   Voice ID:  [id number]

DT:  06/25/2018 17:08:05   Report ID:  [id number]

[facility]ER[facility]

## 2018-06-26 VITALS — SYSTOLIC BLOOD PRESSURE: 144 MMHG | DIASTOLIC BLOOD PRESSURE: 79 MMHG | TEMPERATURE: 97.6 F

## 2018-06-26 VITALS — OXYGEN SATURATION: 97 %

## 2018-06-26 LAB — HCT VFR BLD CALC: 36 % (ref 39.6–49)

## 2018-06-26 RX ADMIN — SODIUM CHLORIDE SCH MLS: 0.9 INJECTION, SOLUTION INTRAVENOUS at 04:55

## 2018-06-26 RX ADMIN — SODIUM CHLORIDE SCH: 0.9 INJECTION, SOLUTION INTRAVENOUS at 13:00

## 2018-06-26 RX ADMIN — Medication SCH: at 09:00

## 2018-06-26 NOTE — EKG
Test Date:    2018-06-25               Test Time:    22:13:44

Technician:   RT-O                                   

                                                     

MEASUREMENT RESULTS:                                       

Intervals:                                           

Rate:         58                                     

DC:           166                                    

QRSD:         90                                     

QT:           444                                    

QTc:          435                                    

Axis:                                                

P:            66                                     

DC:           166                                    

QRS:          -4                                     

T:            36                                     

                                                     

INTERPRETIVE STATEMENTS:                                       

                                                     

Sinus bradycardia

Otherwise normal ECG

Compared to ECG 06/24/2018 22:51:39

Left ventricular hypertrophy no longer present



Electronically Signed On 06-26-18 06:48:21 CDT by Fantasma Castle

## 2018-06-27 NOTE — CON
Date of Consultation:  06/25/2018



A 67-year-old male.



Reason For Consultation:  Lower GI bleeding.



History Of Present Illness:  Mr. Lee is a 67-year-old gentleman with past history of diverticulos
is and diverticular bleeding.  He also has epigastric pain.  He comes with another episode of pain th
at is bleeding.  It first started out with bright red blood, now it is becoming darker.  Also bowel f
requency has slowed down.  The initial above frequency when the symptoms started was 5 to 6.  Today, 
till 4:30 in the morning till now, which is about 1:30, he had only 3 bowel movement.  Also these 2 b
owel movements today are more solidified and more dark.  Denies any weakness or dizziness.  No bowel 
habit change.  No abdominal pain in the lower area; however, he does have a baseline pain in the lowe
r epigastric area all the time.  He is now wearing __________.



Past Medical History:  Other than above, the patient has developed diabetes.



Past Surgical History:  As above.



Family History:  Denies any gastrointestinal malignancy in the past.



Social History:  No alcohol or tobacco.



Psychiatric History:  None.



Allergies:  REVIEWED IN THE CHART.



Medications:  Reviewed in the chart.



Review of Systems:

General:  No weight loss or weight gain.  No fever or chills. 

Pulmonary:  No shortness of breath, cough, or expectoration. 

Cardiac:  No palpitation or heart murmur.  No orthopnea or dyspnea. 

GI:  As elaborated above. 

Hepatologic:  Denies any history of jaundice, hepatitis, any other liver related problem. 

Musculoskeletal:  No arthralgia, myalgia, however, has bilateral hip pain for which he reason he take
s nonsteroidal anti-inflammatory medications. 

Genitourinary:  None. 

Neuropsychiatric:  None.



Physical Examination:

General:  Elderly male, at this time no other acute distress noted. 

Vital Signs:  Hemodynamic and respiratory profile within normal range. 

HEENT:  Atraumatic, normocephalic.  No pallor.  No wasting.  No icterus noted. 

Chest:  Clear to auscultation and percussion. 

Cardiovascular:  Normal S1, S2.  No S3, no S4. 

Abdomen:  Soft, nontender, nondistended.  Excellent bowel sounds in all quadrants although he had ___
_______ epigastric complain of pain.  At this time, no tenderness.  No hepatomegaly.  No splenomegaly
.  No ascites.  No succussion splash. 

Extremities:  Upper and lower extremities are normal and symmetrical. 

Neck:  Supple.  No lymphadenopathy.  Trachea central in position. 

Neurologic:  Alert, oriented x3.  Intact memory, mentation, and judgment.  Can move all his extremiti
es without any other problem.



Diagnostic Data:  Reviewed and analyzed.  Of importance, CT scan only shows diverticulosis.  Hemoglob
in and hematocrit are quite good __________.



Impression, Plan And Recommendations:  Mr. Lee is an elderly gentleman with past history of GI bl
eeding, __________ procedure for diverticular bleeding.  He also takes NSAIDs __________.  At this ti
me, the patient's bowel movement is slowing down and his bowel movement is becoming more solidified. 
 We felt we had stopped the bleeding.  However, other differential diagnosis is upper GI bleeding inc
luding trace of peptic ulcer disease in this patient occasionally takes Aleve. 



Given his above situation and relatively stable hemoglobin and hematocrit, he will be monitored caref
ully.  Start diet and ambulate and see what happens.  If he has recurrent bleeding and __________blee
ding does not stop, he will require lower GI endoscopy and likely also upper if lower GI is negative.
 



I have had a long discussion with the patient and wife regarding the indications, contraindications, 
possible complications, alternatives of all of the above including use of general anesthesia during t
he procedure.  He has good understanding.  He is agreeable.  He is __________ which may eventually go
 down __________





MIKLE/PHIL

DD:  06/26/2018 17:01:21Voice ID:  [id number]

DT:  06/26/2018 23:10:40Report ID:  [id number]

## 2018-06-29 ENCOUNTER — HOSPITAL ENCOUNTER (OUTPATIENT)
Dept: HOSPITAL 97 - OR | Age: 68
Discharge: HOME | End: 2018-06-29
Attending: SURGERY
Payer: COMMERCIAL

## 2018-06-29 VITALS — DIASTOLIC BLOOD PRESSURE: 57 MMHG | OXYGEN SATURATION: 99 % | SYSTOLIC BLOOD PRESSURE: 112 MMHG

## 2018-06-29 VITALS — TEMPERATURE: 97.1 F

## 2018-06-29 DIAGNOSIS — K64.8: ICD-10-CM

## 2018-06-29 DIAGNOSIS — I10: ICD-10-CM

## 2018-06-29 DIAGNOSIS — K92.1: Primary | ICD-10-CM

## 2018-06-29 DIAGNOSIS — K21.9: ICD-10-CM

## 2018-06-29 DIAGNOSIS — E78.00: ICD-10-CM

## 2018-06-29 DIAGNOSIS — K57.30: ICD-10-CM

## 2018-06-29 PROCEDURE — 0DJD8ZZ INSPECTION OF LOWER INTESTINAL TRACT, VIA NATURAL OR ARTIFICIAL OPENING ENDOSCOPIC: ICD-10-PCS

## 2018-06-29 NOTE — ENDO RPT
[address], [address]





COLONOSCOPY PROCEDURE REPORT     EXAM DATE: 06/29/2018



PATIENT NAME:      Marlen Lee           MR #:      [id number]

YOB: 1950      VISIT #:     [id number]

ATTENDING:     Isiah Crow DR     STATUS:     outpatient

ASSISTANT:      Cam Segura and Maki Sigala RN



INDICATIONS:  The patient is a 67 yr old Male here for a colonoscopy due to

melenic bleeding

PROCEDURE PERFORMED:     Colonoscopy

MEDICATIONS:     Per Anesthesia.

ESTIMATED BLOOD LOSS:     None



CONSENT: The patient understands the risks and benefits of the procedure and

understands that these risks include, but are not limited to: sedation,

allergic reaction, infection, perforation and/or bleeding. Alternative means of

evaluation and treatment include, among others: physical exam, x-rays, and/or

surgical intervention. The patient elects to proceed with this endoscopic

procedure.



DESCRIPTION OF PROCEDURE:  During intra-op preparation period all mechanical 

medical equipment was checked for proper function. Hand hygiene and appropriate

measures for infection prevention was taken.  Procedure, possible

complications, alternatives including, but not limited to possibility of

bleeding, perforation, tear, infection, sepsis, need for surgery, need for

blood transfusion, were explained to the patient.  After the risks, benefits

and alternatives of the procedure were thoroughly explained, Informed consent

was verified, confirmed and timeout was successfully executed by the treatment

team.  The patient was placed in the left lateral position.   A digital rectal

exam was performed and revealed internal hemorrhoids and A digital rectal exam

was performed and revealed a nodule prostate.  After appropriate level of

anesthesia, the scope was passed.  The EC-[id number] ([id number]) endoscope was

introduced through the anus and advanced to the cecum, which was identified by

both the appendix and ileocecal valve. The quality of the prep was fair. The

instrument was then slowly withdrawn as the colon was fully examined.  Scope

withdrawal time was 12 minutes.



COLON FINDINGS: Severe diverticulosis was noted in the right colon and left

colon.  No bleeding was noted from the diverticulosis.   No bleeding source

identified, however there was flakes of blood visualized in cecum and from

ileal effluent.  Retroflexed views revealed small hemorrhoids. The scope was

then completely withdrawn from the patient and the procedure terminated.







ADVERSE EVENTS:      There were no complications.

IMPRESSIONS:     1.  Severe diverticulosis was noted in the right colon and left

colon

2.  No bleeding source identified, however there was flakes of blood visualized

in cecum and from ileal effluent



RECOMMENDATIONS:     1.  fiber rich diet

2.  avoid NSAIDS for 2 weeks

3.  follow-up: office 1 week(s)

4.  hemorrhoidal hygiene

5.  Continue workup for bleeding

RECALL:     Return in 1 week(s) for EGD.  Bleeding workup



_____________________________

Isiah Crow DR

eSigned:  Isiah Crow DR 06/29/2018 10:31 AM





cc:



CPT CODES:

ICD9 CODES:





PATIENT NAME:  JesusMarlen

MR#: [id number]

## 2019-01-07 LAB
BUN BLD-MCNC: 16 MG/DL (ref 7–18)
GLUCOSE SERPLBLD-MCNC: 113 MG/DL (ref 74–106)
HCT VFR BLD CALC: 45.3 % (ref 39.6–49)
INR BLD: 1.05
LYMPHOCYTES # SPEC AUTO: 1.4 K/UL (ref 0.7–4.9)
PMV BLD: 9 FL (ref 7.6–11.3)
POTASSIUM SERPL-SCNC: 4.2 MMOL/L (ref 3.5–5.1)
RBC # BLD: 4.89 M/UL (ref 4.33–5.43)

## 2019-01-07 NOTE — RAD REPORT
EXAM DESCRIPTION:  RAD - Chest Pa And Lat (2 Views) - 1/7/2019 3:10 pm

 

CLINICAL HISTORY:  preop

Chest pain.

 

COMPARISON:  Chest Single View dated 6/24/2018; Chest Pa And Lat (2 Views) dated 1/25/2018; Chest Sin
gle View dated 10/6/2017; Chest Single View dated 8/9/2017

 

FINDINGS:  The lungs are clear. The heart is normal in size. No displaced fractures.

 

IMPRESSION:  No acute or concerning finding suspected.

## 2019-01-08 ENCOUNTER — HOSPITAL ENCOUNTER (OUTPATIENT)
Dept: HOSPITAL 97 - CCL | Age: 69
Discharge: HOME HEALTH | End: 2019-01-08
Attending: INTERNAL MEDICINE
Payer: COMMERCIAL

## 2019-01-08 VITALS — TEMPERATURE: 97.9 F | SYSTOLIC BLOOD PRESSURE: 124 MMHG | OXYGEN SATURATION: 95 % | DIASTOLIC BLOOD PRESSURE: 64 MMHG

## 2019-01-08 DIAGNOSIS — K21.9: ICD-10-CM

## 2019-01-08 DIAGNOSIS — N40.1: ICD-10-CM

## 2019-01-08 DIAGNOSIS — Z95.5: ICD-10-CM

## 2019-01-08 DIAGNOSIS — Z82.49: ICD-10-CM

## 2019-01-08 DIAGNOSIS — I25.110: Primary | ICD-10-CM

## 2019-01-08 DIAGNOSIS — I10: ICD-10-CM

## 2019-01-08 PROCEDURE — 71046 X-RAY EXAM CHEST 2 VIEWS: CPT

## 2019-01-08 PROCEDURE — 93454 CORONARY ARTERY ANGIO S&I: CPT

## 2019-01-08 PROCEDURE — 80048 BASIC METABOLIC PNL TOTAL CA: CPT

## 2019-01-08 PROCEDURE — 85025 COMPLETE CBC W/AUTO DIFF WBC: CPT

## 2019-01-08 PROCEDURE — 85610 PROTHROMBIN TIME: CPT

## 2019-01-08 PROCEDURE — 85730 THROMBOPLASTIN TIME PARTIAL: CPT

## 2019-01-08 PROCEDURE — 36415 COLL VENOUS BLD VENIPUNCTURE: CPT

## 2019-01-11 NOTE — OP
Surgeon:  Fantasma Castle MD



Assistant:  Heather Schmitz.



Admitted to myself as an outpatient on 01/08/2019.



Reason For Admission:  History of coronary artery disease and unstable angina.  The reason for admiss
ion was also an outpatient left heart catheterization.



Description Of Procedure:  The patient was brought to the catheterization lab, prepped and draped in 
the routine sterile fashion; 2 mg of Versed was given for sedation.  Left heart catheterization with 
selective coronary artery angiogram was done.  The right common femoral artery access was done with a
 6-[place] sheath without any complication.  Angiography there was normal.  [employer] was used to clos
e the case.  Jonh catheter was used for the injection of the left main and the right main.  He was
 found to have a normal RCA and circumflex.  He had a patent LAD stent without any in-stent restenosi
s.  Minimal plaquing distally in the LAD.  The patient tolerated the procedure well.  There were no c
omplications.



Blood Loss:  5 cc.



Final Diagnosis:  Left anterior descending stent that is patent by angiography.  Minimal coronary art
anastacio disease, otherwise.



Plan:  Continue medical therapy. 



Total conscious sedation was 30 minutes.





NB/PHIL

DD:  01/10/2019 19:35:25Voice ID:  [id number]

DT:  01/11/2019 03:22:09Report ID:  [id number]

## 2021-07-10 ENCOUNTER — HOSPITAL ENCOUNTER (EMERGENCY)
Dept: HOSPITAL 97 - ER | Age: 71
Discharge: HOME | End: 2021-07-10
Payer: COMMERCIAL

## 2021-07-10 VITALS — TEMPERATURE: 97.7 F

## 2021-07-10 VITALS — SYSTOLIC BLOOD PRESSURE: 140 MMHG | DIASTOLIC BLOOD PRESSURE: 80 MMHG

## 2021-07-10 VITALS — OXYGEN SATURATION: 100 %

## 2021-07-10 DIAGNOSIS — I10: ICD-10-CM

## 2021-07-10 DIAGNOSIS — Z88.6: ICD-10-CM

## 2021-07-10 DIAGNOSIS — Z95.818: ICD-10-CM

## 2021-07-10 DIAGNOSIS — K57.32: Primary | ICD-10-CM

## 2021-07-10 LAB
ALBUMIN SERPL BCP-MCNC: 3.3 G/DL (ref 3.4–5)
ALP SERPL-CCNC: 58 U/L (ref 45–117)
ALT SERPL W P-5'-P-CCNC: 42 U/L (ref 12–78)
AST SERPL W P-5'-P-CCNC: 19 U/L (ref 15–37)
BUN BLD-MCNC: 13 MG/DL (ref 7–18)
GLUCOSE SERPLBLD-MCNC: 115 MG/DL (ref 74–106)
HCT VFR BLD CALC: 43.6 % (ref 39.6–49)
LIPASE SERPL-CCNC: 87 U/L (ref 73–393)
LYMPHOCYTES # SPEC AUTO: 1.4 K/UL (ref 0.7–4.9)
PMV BLD: 8.8 FL (ref 7.6–11.3)
POTASSIUM SERPL-SCNC: 4 MMOL/L (ref 3.5–5.1)
RBC # BLD: 4.78 M/UL (ref 4.33–5.43)

## 2021-07-10 PROCEDURE — 80048 BASIC METABOLIC PNL TOTAL CA: CPT

## 2021-07-10 PROCEDURE — 74177 CT ABD & PELVIS W/CONTRAST: CPT

## 2021-07-10 PROCEDURE — 36415 COLL VENOUS BLD VENIPUNCTURE: CPT

## 2021-07-10 PROCEDURE — 96374 THER/PROPH/DIAG INJ IV PUSH: CPT

## 2021-07-10 PROCEDURE — 81003 URINALYSIS AUTO W/O SCOPE: CPT

## 2021-07-10 PROCEDURE — 99284 EMERGENCY DEPT VISIT MOD MDM: CPT

## 2021-07-10 PROCEDURE — 85025 COMPLETE CBC W/AUTO DIFF WBC: CPT

## 2021-07-10 PROCEDURE — 83690 ASSAY OF LIPASE: CPT

## 2021-07-10 PROCEDURE — 96375 TX/PRO/DX INJ NEW DRUG ADDON: CPT

## 2021-07-10 PROCEDURE — 80076 HEPATIC FUNCTION PANEL: CPT

## 2021-07-10 NOTE — RAD REPORT
EXAM DESCRIPTION:  CT - Abdomen   Pelvis W Contrast - 7/10/2021 6:58 am

 

CLINICAL HISTORY:  ABD PAIN

 

COMPARISON:  Abdomen   Pelvis W Contrast dated 6/24/2018

 

TECHNIQUE:  Biphasic, helical CT imaging of the abdomen and pelvis was performed following 100 ml non
-ionic IV contrast.

 

No oral contrast administered.

 

All CT scans are performed using dose optimization technique as appropriate and may include automated
 exposure control or mA/KV adjustment according to patient size.

 

FINDINGS:  No suspicious findings in the lung bases. Fat containing hiatal hernia is present.

 

Diffuse fatty infiltration of the liver noted. No portal vein abnormality. Spleen and pancreas show n
o suspicious findings. Gallbladder and biliary tree are also without suspicious finding.

 

Symmetric renal function is seen with no hydronephrosis or suspicious renal mass. No pyelonephritis o
r acute parenchymal process. Partially filled urinary bladder shows no suspicious finding. No adrenal
 abnormalities. Prostate calcifications are present.

 

No stomach or small bowel abnormality seen. No appendicitis. From cecum through descending colon ther
e is no acute finding. Patient has large amount of retained contrast material within diverticula. [place]
rt segment of the proximal sigmoid colon shows circumferential wall thickening. There is edema and st
randing in the adjacent fat. No extraluminal free air or extravasation of bowel content seen.

 

  No free air, free fluid, pneumatosis or other site of inflammatory stranding.  No mass or bulky lym
phadenopathy. Small fat only left inguinal hernia is present.

 

No suspicious bony findings. Prominent disc and bone degenerative changes are present. Lower lumbar f
acet degenerative changes severe.

 

 

IMPRESSION:  Mild acute sigmoid diverticulitis.

 

No free air, abscess or other surgically emergent complication.

## 2021-07-10 NOTE — XMS REPORT
Continuity of Care Document

                            Created on:July 10, 2021



Patient:JASON WHITAKER

Sex:Male

:1950

External Reference #:[id number]





Demographics







                          Address                   [address]



                                                    [place] [address]

 

                          Home Phone                [phone]

 

                          Mobile Phone              [phone]

 

                          Email Address             [email or web address]

 

                          Preferred Language        en

 

                          Marital Status            Unknown

 

                          Sikh Affiliation     Unknown

 

                          Race                      Unknown

 

                          Additional Race(s)        Unavailable



                                                    White

 

                          Ethnic Group              Unknown









Author







                          Organization              [facility]

t

 

                          Address                   [address] [place] [address] [address]



                                                    [place] [address]

 

                          Phone                     [phone]









Support







                Name            Relationship    Address         Phone

 

                Jesus         Spouse          [address]   [phone]



                                                [place] [address] 









Care Team Providers







                    Name                Role                Phone

 

                    ISAURO Ponce      Attending Clinician [phone]

 

                    Lab,  [facility]     Attending Clinician Unavailable









Problems

This patient has no known problems.



Allergies, Adverse Reactions, Alerts

This patient has no known allergies or adverse reactions.



Medications

This patient has no known medications.



Procedures

This patient has no known procedures.



Encounters







        Start   End     Encounter Admission Attending Care    Care    Encounter 

Source



        Date/Time Date/Time Type    Type    Clinicians Facility Department ID   

   

 

        2021 Telephone         [facility]    [phone]

[id number] 



        00:00:00 00:00:00                 [address] [facility]  [id number]        

 



                                                [place] [id number]         



                                                [place] [phone]         



                                                nal     044             



                                                Office                  



                                                Building                 



                                                One                     

 

        2021 Laboratory         Lab, [facility]    [id number]

[id number] 



        16:07:53 16:27:53 Only            [facility]  [id number]         



                                                [place] [id number]         



                                                [place] [phone]         



                                                nal     044             



                                                Office                  



                                                Building                 



                                                One                     







Results

This patient has no known results.

## 2021-07-10 NOTE — EDPHYS
Physician Documentation                                                                           

 [facility]                                                                 

Name: Marlen Lee                                                                              

Age: 70 yrs                                                                                       

Sex: Male                                                                                         

: 1950                                                                                   

MRN: [id number]                                                                                   

Arrival Date: 07/10/2021                                                                          

Time: 04:32                                                                                       

Account#: [id number]                                                                            

Bed 5                                                                                             

Private MD: Kirk Pacheco B                                                                     

ED Physician Rajat Child                                                                      

HPI:                                                                                              

07/10                                                                                             

06:14 This 70 yrs old  Male presents to ER via Ambulatory with complaints of         mh7 

      Abdominal Pain.                                                                             

06:14 The patient presents with abdominal pain in the left lower quadrant. Onset: The         mh7 

      symptoms/episode began/occurred last night. The symptoms do not radiate.                    

06:15 Associated signs and symptoms: Pertinent negatives: nausea, vomiting, and diarrhea,     mh7 

      anorexia, blood in stools, chest pain, diarrhea, dysuria, fever, headache, hematuria,       

      nausea, palpitations, shortness of breath, testicular pain, vomiting, vomiting blood.       

06:18 The symptoms are described as intermittent, vague, waxing/waning. Modifying factors:    mh7 

      The symptoms are alleviated by nothing, the symptoms are aggravated by nothing.             

      Severity of pain: At its worst the pain was moderate last night, in the emergency           

      department the pain is unchanged.                                                           

                                                                                                  

Historical:                                                                                       

- Allergies:                                                                                      

04:44 NSAIDS;                                                                                 rr5 

04:44 Aspirin;                                                                                rr5 

- PMHx:                                                                                           

04:44 Diverticulitis; GERD; Kidney stones; Hypertension; Glaucoma; prediabetes;               rr5 

- PSHx:                                                                                           

04:44 heart stent;                                                                            rr5 

                                                                                                  

- Immunization history:: Adult Immunizations up to date.                                          

- Social history:: Smoking status: Patient denies any tobacco usage or history of.                

                                                                                                  

                                                                                                  

ROS:                                                                                              

06:18 Constitutional: Negative for fever, chills, and weight loss, Eyes: Negative for injury, mh7 

      pain, redness, and discharge, ENT: Negative for injury, pain, and discharge, Neck:          

      Negative for injury, pain, and swelling, Cardiovascular: Negative for chest pain,           

      palpitations, and edema, Respiratory: Negative for shortness of breath, cough,              

      wheezing, and pleuritic chest pain, Back: Negative for injury and pain, : Negative        

      for injury, bleeding, discharge, and swelling, MS/Extremity: Negative for injury and        

      deformity, Skin: Negative for injury, rash, and discoloration, Neuro: Negative for          

      headache, weakness, numbness, tingling, and seizure, Psych: Negative for depression,        

      anxiety, suicide ideation, homicidal ideation, and hallucinations, Allergy/Immunology:      

      Negative for hives, rash, and allergies, Endocrine: Negative for neck swelling,             

      polydipsia, polyuria, polyphagia, and marked weight changes, Hematologic/Lymphatic:         

      Negative for swollen nodes, abnormal bleeding, and unusual bruising.                        

                                                                                                  

Exam:                                                                                             

06:18 Constitutional:  This is a well developed, well nourished patient who is awake, alert,  mh7 

      and in no acute distress. Head/Face:  Normocephalic, atraumatic. Eyes:  Pupils equal        

      round and reactive to light, extra-ocular motions intact.  Lids and lashes normal.          

      Conjunctiva and sclera are non-icteric and not injected.  Cornea within normal limits.      

      Periorbital areas with no swelling, redness, or edema. Neck:  Trachea midline, no           

      thyromegaly or masses palpated, and no cervical lymphadenopathy.  Supple, full range of     

      motion without nuchal rigidity, or vertebral point tenderness.  No Meningismus.             

      Chest/axilla:  Normal chest wall appearance and motion.  Nontender with no deformity.       

      No lesions are appreciated. Cardiovascular:  Regular rate and rhythm with a normal S1       

      and S2.  No gallops, murmurs, or rubs.  Normal PMI, no JVD.  No pulse deficits.             

      Respiratory:  Lungs have equal breath sounds bilaterally, clear to auscultation and         

      percussion.  No rales, rhonchi or wheezes noted.  No increased work of breathing, no        

      retractions or nasal flaring. Back:  No spinal tenderness.  No costovertebral               

      tenderness.  Full range of motion.                                                          

06:18 Skin:  Warm, dry with normal turgor.  Normal color with no rashes, no lesions, and no       

      evidence of cellulitis. MS/ Extremity:  Pulses equal, no cyanosis.  Neurovascular           

      intact.  Full, normal range of motion. Neuro:  Awake and alert, GCS 15, oriented to         

      person, place, time, and situation.  Cranial nerves II-XII grossly intact.  Motor           

      strength 5/5 in all extremities.  Sensory grossly intact.  Cerebellar exam normal.          

      Normal gait. Psych:  Awake, alert, with orientation to person, place and time.              

      Behavior, mood, and affect are within normal limits.                                        

06:18 Abdomen/GI: Inspection: abdomen appears normal, Bowel sounds: normal, in all quadrants,     

      Palpation: moderate abdominal tenderness, in the left lower quadrant, mass, is not          

      appreciated, rebound tenderness, is not appreciated, voluntary guarding, is not             

      appreciated, involuntary guarding, is not appreciated, no appreciated organomegaly,         

      Rectal exam: the exam is deferred, because of patient request, Indicators: McBurney's       

      point is not tender, Montero's sign is negative, Rovsing's sign is negative, Obturator       

      sign is negative, Psoas sign is negative, Liver: no appreciated palpable abnormalities,     

      Hernia: not appreciated.                                                                    

                                                                                                  

Vital Signs:                                                                                      

04:43  / 81; Pulse 59; Resp 18; Temp 97.7; Pulse Ox 95% on R/A; Weight 99.79 kg; Height rr5 

      5 ft. 8 in. (172.72 cm); Pain 5/10;                                                         

06:10  / 74; Pulse 62; Resp 18; Pulse Ox 98% on R/A;                                    ak2 

07:40  / 69; Pulse 56; Resp 17; Pulse Ox 100% on R/A;                                   ap3 

09:04  / 80; Pulse 51; Resp 16; Pulse Ox 100% on R/A; Pain 8/10;                        ap3 

04:43 Body Mass Index 33.45 (99.79 kg, 172.72 cm)                                             rr5 

                                                                                                  

MDM:                                                                                              

08:45 Patient medically screened.                                                             tw4 

                                                                                                  

07/10                                                                                             

04:46 Order name: Basic Metabolic Panel; Complete Time: 05:54                                 rr5 

07/10                                                                                             

08:43 Interpretation: Normal except: GLUC 115; GFR 83.                                        tw4 

07/10                                                                                             

04:46 Order name: CBC with Diff; Complete Time: 05:54                                         5 

07/10                                                                                             

08:43 Interpretation: Normal except: MN% 13.6.                                                tw4 

07/10                                                                                             

04:46 Order name: Hepatic Function; Complete Time: 05:54                                      5 

07/10                                                                                             

08:43 Interpretation: Normal except: ALB 3.3; GLOB 3.7; A/G 0.9.                              tw4 

07/10                                                                                             

04:46 Order name: Lipase; Complete Time: 05:54                                                5 

07/10                                                                                             

06:04 Order name: CT Abd/Pelvis - IV Contrast Only; Complete Time: 08:32                      mh7 

07/10                                                                                             

08:38 Interpretation: Abnormal.                                                               tw4 

07/10                                                                                             

06:52 Order name: Urine Dipstick-Ancillary; Complete Time: 06:55                              EDMS

07/10                                                                                             

04:46 Order name: IV Saline Lock; Complete Time: 05:20                                        rr5 

07/10                                                                                             

04:46 Order name: Labs collected and sent; Complete Time: 05:20                               rr5 

07/10                                                                                             

06:02 Order name: Urine Dipstick-Ancillary (obtain specimen); Complete Time: 06:53            7 

                                                                                                  

Administered Medications:                                                                         

06:05 Drug: Zofran (Ondansetron) 4 mg Route: IVP; Site: right antecubital;                    rr5 

06:53 Follow up: Response: No adverse reaction                                                rr5 

06:09 Drug: morphine 4 mg {Note: rass0.} Route: IVP; Site: right antecubital;                 rr5 

06:53 Follow up: Response: No adverse reaction; RASS: Alert and Calm (0)                      rr5 

06:11 Drug: NS 0.9% 500 ml Route: IV; Rate: bolus; Site: right antecubital;                   rr5 

09:03 Drug: Cipro (ciprofloxacin) 500 mg Route: PO;                                           ap3 

09:03 Follow up: Response: No adverse reaction                                                ap3 

09:03 Drug: Flagyl (metroNIDAZOLE) 500 mg Route: PO;                                          ap3 

09:03 Follow up: Response: No adverse reaction                                                ap3 

09:04 Drug: morphine 4 mg Route: IVP; Site: right antecubital;                                ap3 

09:04 Follow up: Response: No adverse reaction; Pain is decreased; RASS: Alert and Calm (0)   ap3 

                                                                                                  

                                                                                                  

Disposition Summary:                                                                              

07/10/21 08:45                                                                                    

Discharge Ordered                                                                                 

      Location: Home                                                                          tw4 

      Problem: new                                                                            tw4 

      Symptoms: have improved                                                                 tw4 

      Condition: Stable                                                                       tw4 

      Diagnosis                                                                                   

        - Diverticulitis of large intestine without perforation or abscess without bleeding   tw4 

      Followup:                                                                               tw4 

        - With: Private Physician                                                                  

        - When: Upon discharge from the Emergency Department                                       

        - Reason: Recheck today's complaints, Continuance of care, Re-evaluation by your           

      physician                                                                                   

      Discharge Instructions:                                                                     

        - Discharge Summary Sheet                                                             tw4 

        - High-Fiber Diet                                                                     tw4 

        - Diverticulitis                                                                      tw4 

      Forms:                                                                                      

        - Medication Reconciliation Form                                                      tw4 

        - Thank You Letter                                                                    tw4 

        - Antibiotic Education                                                                tw4 

        - Prescription Opioid Use                                                             tw4 

      Prescriptions:                                                                              

        - Flagyl 500 mg Oral Tablet                                                                

            - take 1 tablet by ORAL route every 6 hours for 10 days; 40 tablet; Refills: 0,   tw4 

      Product Selection Permitted                                                                 

        - levofloxacin 500 mg Oral Tablet                                                          

            - take 1 tablet by ORAL route once daily for 7 days; 7 tablet; Refills: 0,        tw4 

      Product Selection Permitted                                                                 

        - Tramadol 50 mg Oral Tablet                                                               

            - take 1 tablet by ORAL route every 8 hours as needed; 12 tablet; Refills: 0,     tw4 

      Product Selection Permitted                                                                 

Signatures:                                                                                       

Dispatcher [employer]                           Bruno Blackman MD MD   tw4                                                  

Cassie Fontenot RN                    RN   ap3                                                  

Yuri Lester RN                      RN   rr5                                                  

Rajat Child MD MD   mh7                                                  

                                                                                                  

Corrections: (The following items were deleted from the chart)                                    

04:45 04:44 Allergies: NKA; rr5                                                               rr5 

                                                                                                  

**************************************************************************************************

## 2021-07-10 NOTE — ER
Nurse's Notes                                                                                     

 [facility]                                                                 

Name: Marlen Lee                                                                              

Age: 70 yrs                                                                                       

Sex: Male                                                                                         

: 1950                                                                                   

MRN: [id number]                                                                                   

Arrival Date: 07/10/2021                                                                          

Time: 04:32                                                                                       

Account#: [id number]                                                                            

Bed 5                                                                                             

Private MD: Kirk Pacheco B                                                                     

Diagnosis: Diverticulitis of large intestine without perforation or abscess without bleeding      

                                                                                                  

Presentation:                                                                                     

07/10                                                                                             

04:43 Chief complaint: Patient states: reports LLQ pain that started yesterday, denies N/V/D  rr5 

      or fever, hx of diverticulitis. Coronavirus screen: Client denies travel out of the         

      [place] in the last 14 days. Ebola Screen: Patient negative for fever greater than or          

      equal to 101.5 degrees Fahrenheit, and additional compatible Ebola Virus Disease            

      symptoms Patient denies exposure to infectious person. Patient denies travel to an          

      Ebola-affected area in the 21 days before illness onset. No symptoms or risks               

      identified at this time. Initial Sepsis Screen: Does the patient meet any 2 criteria?       

      No. Patient's initial sepsis screen is negative. Does the patient have a suspected          

      source of infection? No. Patient's initial sepsis screen is negative. Risk Assessment:      

      Do you want to hurt yourself or someone else? Patient reports no desire to harm self or     

      others. Onset of symptoms was July 10, 2021.                                                

04:43 Method Of Arrival: Ambulatory                                                           rr5 

04:43 Acuity: ADRIANNA 3                                                                           rr5 

                                                                                                  

Historical:                                                                                       

- Allergies:                                                                                      

04:44 NSAIDS;                                                                                 rr5 

04:44 Aspirin;                                                                                rr5 

- PMHx:                                                                                           

04:44 Diverticulitis; GERD; Kidney stones; Hypertension; Glaucoma; prediabetes;               rr5 

- PSHx:                                                                                           

04:44 heart stent;                                                                            rr5 

                                                                                                  

- Immunization history:: Adult Immunizations up to date.                                          

- Social history:: Smoking status: Patient denies any tobacco usage or history of.                

                                                                                                  

                                                                                                  

Screenin:58 Abuse screen: Denies threats or abuse. Denies injuries from another. Nutritional        rr5 

      screening: No deficits noted. Tuberculosis screening: No symptoms or risk factors           

      identified. Fall Risk IV access (20 points). Total Smith Fall Scale indicates No Risk       

      (0-24 pts).                                                                                 

                                                                                                  

Assessment:                                                                                       

04:58 General: Appears in no apparent distress. uncomfortable, Behavior is calm, cooperative, rr5 

      appropriate for age. Pain: Complains of pain in left lower quadrant Pain currently is 5     

      out of 10 on a pain scale. Quality of pain is described as aching, Pain began               

      gradually, Is intermittent. Neuro: Level of Consciousness is awake, alert, obeys            

      commands, Oriented to person, place, time. Cardiovascular: Capillary refill < 3 seconds     

      Patient's skin is warm and dry. Respiratory: Airway is patent Respiratory effort is         

      even, unlabored, Respiratory pattern is regular, symmetrical. GI: Abdomen is round Abd      

      is soft Reports lower abdominal pain. : No signs and/or symptoms were reported            

      regarding the genitourinary system. EENT: No signs and/or symptoms were reported            

      regarding the EENT system. Derm: Skin temperature is warm. Musculoskeletal: Capillary       

      refill < 3 seconds.                                                                         

06:10 Reassessment: Patient and/or family updated on plan of care and expected duration. Pain ak2 

      level reassessed.                                                                           

06:53 Reassessment: Patient appears in no apparent distress at this time. back form CTscan.   rr5 

07:06 Reassessment: Patient and/or family updated on plan of care and expected duration. Pain ap3 

      level reassessed. Patient is alert, oriented x 3, equal unlabored respirations, skin        

      warm/dry/pink. patient states pain is returning in his left lower quadrant. He states       

      it is stabbing, and intermittent. Patient used the 0-10 pain scale and says his pain is     

      currently an 8/10.                                                                          

                                                                                                  

Vital Signs:                                                                                      

04:43  / 81; Pulse 59; Resp 18; Temp 97.7; Pulse Ox 95% on R/A; Weight 99.79 kg; Height rr5 

      5 ft. 8 in. (172.72 cm); Pain 5/10;                                                         

06:10  / 74; Pulse 62; Resp 18; Pulse Ox 98% on R/A;                                    ak2 

07:40  / 69; Pulse 56; Resp 17; Pulse Ox 100% on R/A;                                   ap3 

09:04  / 80; Pulse 51; Resp 16; Pulse Ox 100% on R/A; Pain 8/10;                        ap3 

04:43 Body Mass Index 33.45 (99.79 kg, 172.72 cm)                                             rr5 

                                                                                                  

ED Course:                                                                                        

04:32 Patient arrived in ED.                                                                  am4 

04:32 Kirk Pacheco MD is Private Physician.                                                am4 

04:37 Lester, Yuri, RN is Primary Nurse.                                                    rr5 

04:44 Triage completed.                                                                       rr5 

04:44 Arm band placed on.                                                                     rr5 

04:45 Patient has correct armband on for positive identification. Bed in low position. Call   rr5 

      light in reach. Pulse ox on. NIBP on.                                                       

04:48 Rajat Child MD is Attending Physician.                                             [facility] 

04:57 Inserted saline lock: 20 gauge in right antecubital area, using aseptic technique.      rr5 

      Blood collected.                                                                            

06:53 Urine collected: clean catch specimen, clear.                                           rr5 

06:58 CT Abd/Pelvis - IV Contrast Only In Process Unspecified.                                EDMS

08:07 Primary Nurse role handed off by Yuri Lester RN                                     ap3 

08:07 Cassie Fontenot, RN is Primary Nurse.                                                  ap3 

09:05 IV discontinued, intact, bleeding controlled, No redness/swelling at site. Pressure     ap3 

      dressing applied.                                                                           

09:05 No provider procedures requiring assistance completed.                                  ap3 

                                                                                                  

Administered Medications:                                                                         

06:05 Drug: Zofran (Ondansetron) 4 mg Route: IVP; Site: right antecubital;                    rr5 

06:53 Follow up: Response: No adverse reaction                                                rr5 

06:09 Drug: morphine 4 mg {Note: rass0.} Route: IVP; Site: right antecubital;                 rr5 

06:53 Follow up: Response: No adverse reaction; RASS: Alert and Calm (0)                      rr5 

06:11 Drug: NS 0.9% 500 ml Route: IV; Rate: bolus; Site: right antecubital;                   rr5 

09:03 Drug: Cipro (ciprofloxacin) 500 mg Route: PO;                                           ap3 

09:03 Follow up: Response: No adverse reaction                                                ap3 

09:03 Drug: Flagyl (metroNIDAZOLE) 500 mg Route: PO;                                          ap3 

09:03 Follow up: Response: No adverse reaction                                                ap3 

09:04 Drug: morphine 4 mg Route: IVP; Site: right antecubital;                                ap3 

09:04 Follow up: Response: No adverse reaction; Pain is decreased; RASS: Alert and Calm (0)   ap3 

                                                                                                  

                                                                                                  

Outcome:                                                                                          

08:45 Discharge ordered by MD.                                                                tw4 

09:05 Discharged to home ambulatory, with significant other.                                  ap3 

09:05 Condition: good                                                                             

09:05 Discharge instructions given to patient, significant other, Instructed on discharge         

      instructions, follow up and referral plans. medication usage, Demonstrated                  

      understanding of instructions, follow-up care, medications, Prescriptions given X 3.        

09:05 Patient left the ED.                                                                    ap3 

                                                                                                  

Signatures:                                                                                       

Dispatcher [employer]                           EDBruno Tejeda MD MD   tw4                                                  

Cassie Fontenot RN                    RN   ap3                                                  

Yuri Lester RN                      RN   rr5                                                  

Rajat Child MD MD   mh7                                                  

Margaret Levy                             4                                                  

Bill Gamez2                                                  

                                                                                                  

Corrections: (The following items were deleted from the chart)                                    

04:45 04:44 Allergies: NKA; rr5                                                               rr5 

                                                                                                  

**************************************************************************************************

## 2021-07-15 ENCOUNTER — HOSPITAL ENCOUNTER (INPATIENT)
Dept: HOSPITAL 97 - ER | Age: 71
Discharge: HOME | DRG: 287 | End: 2021-07-15
Attending: HOSPITALIST | Admitting: HOSPITALIST
Payer: COMMERCIAL

## 2021-07-15 VITALS — OXYGEN SATURATION: 99 %

## 2021-07-15 VITALS — DIASTOLIC BLOOD PRESSURE: 69 MMHG | SYSTOLIC BLOOD PRESSURE: 147 MMHG

## 2021-07-15 VITALS — TEMPERATURE: 97.1 F

## 2021-07-15 DIAGNOSIS — Z95.5: ICD-10-CM

## 2021-07-15 DIAGNOSIS — Z88.8: ICD-10-CM

## 2021-07-15 DIAGNOSIS — K44.9: ICD-10-CM

## 2021-07-15 DIAGNOSIS — I25.110: Primary | ICD-10-CM

## 2021-07-15 DIAGNOSIS — Z79.82: ICD-10-CM

## 2021-07-15 DIAGNOSIS — E66.9: ICD-10-CM

## 2021-07-15 DIAGNOSIS — Z79.899: ICD-10-CM

## 2021-07-15 DIAGNOSIS — I10: ICD-10-CM

## 2021-07-15 DIAGNOSIS — K21.9: ICD-10-CM

## 2021-07-15 DIAGNOSIS — E11.9: ICD-10-CM

## 2021-07-15 DIAGNOSIS — Z79.84: ICD-10-CM

## 2021-07-15 LAB
ALBUMIN SERPL BCP-MCNC: 3.4 G/DL (ref 3.4–5)
ALP SERPL-CCNC: 56 U/L (ref 45–117)
ALT SERPL W P-5'-P-CCNC: 52 U/L (ref 12–78)
AST SERPL W P-5'-P-CCNC: 31 U/L (ref 15–37)
BUN BLD-MCNC: 14 MG/DL (ref 7–18)
GLUCOSE SERPLBLD-MCNC: 108 MG/DL (ref 74–106)
HCT VFR BLD CALC: 43.8 % (ref 39.6–49)
INR BLD: 1.23
LYMPHOCYTES # SPEC AUTO: 1.6 K/UL (ref 0.7–4.9)
MAGNESIUM SERPL-MCNC: 2.1 MG/DL (ref 1.8–2.4)
NT-PROBNP SERPL-MCNC: 37 PG/ML (ref ?–125)
PMV BLD: 8.8 FL (ref 7.6–11.3)
POTASSIUM SERPL-SCNC: 4 MMOL/L (ref 3.5–5.1)
RBC # BLD: 4.79 M/UL (ref 4.33–5.43)
TROPONIN (EMERG DEPT USE ONLY): < 0.02 NG/ML (ref 0–0.04)

## 2021-07-15 PROCEDURE — 85610 PROTHROMBIN TIME: CPT

## 2021-07-15 PROCEDURE — 36415 COLL VENOUS BLD VENIPUNCTURE: CPT

## 2021-07-15 PROCEDURE — 83735 ASSAY OF MAGNESIUM: CPT

## 2021-07-15 PROCEDURE — 4A023N7 MEASUREMENT OF CARDIAC SAMPLING AND PRESSURE, LEFT HEART, PERCUTANEOUS APPROACH: ICD-10-PCS

## 2021-07-15 PROCEDURE — 99285 EMERGENCY DEPT VISIT HI MDM: CPT

## 2021-07-15 PROCEDURE — 93454 CORONARY ARTERY ANGIO S&I: CPT

## 2021-07-15 PROCEDURE — 83880 ASSAY OF NATRIURETIC PEPTIDE: CPT

## 2021-07-15 PROCEDURE — 80076 HEPATIC FUNCTION PANEL: CPT

## 2021-07-15 PROCEDURE — 84484 ASSAY OF TROPONIN QUANT: CPT

## 2021-07-15 PROCEDURE — 80048 BASIC METABOLIC PNL TOTAL CA: CPT

## 2021-07-15 PROCEDURE — 85025 COMPLETE CBC W/AUTO DIFF WBC: CPT

## 2021-07-15 PROCEDURE — B2111ZZ FLUOROSCOPY OF MULTIPLE CORONARY ARTERIES USING LOW OSMOLAR CONTRAST: ICD-10-PCS

## 2021-07-15 PROCEDURE — 71045 X-RAY EXAM CHEST 1 VIEW: CPT

## 2021-07-15 PROCEDURE — 93005 ELECTROCARDIOGRAM TRACING: CPT

## 2021-07-15 NOTE — ER
Nurse's Notes                                                                                     

 [facility]                                                                 

Name: Marlen Lee                                                                              

Age: 70 yrs                                                                                       

Sex: Male                                                                                         

: 1950                                                                                   

MRN: [id number]                                                                                   

Arrival Date: 07/15/2021                                                                          

Time: 04:14                                                                                       

Account#: [id number]                                                                            

Bed 15                                                                                            

Private MD: Kirk Pacheco B                                                                     

Diagnosis: Chest pain, unspecified                                                                

                                                                                                  

Presentation:                                                                                     

07/15                                                                                             

04:41 Chief complaint: Patient states: chest pain x3 days. Coronavirus screen: Client denies  ak2 

      travel out of the [place] in the last 14 days. At this time, the client does not indicate      

      any symptoms associated with coronavirus-19. Ebola Screen: Patient negative for fever       

      greater than or equal to 101.5 degrees Fahrenheit, and additional compatible Ebola          

      Virus Disease symptoms Patient denies exposure to infectious person. Patient denies         

      travel to an Ebola-affected area in the 21 days before illness onset. No symptoms or        

      risks identified at this time. Initial Sepsis Screen: Does the patient meet any 2           

      criteria? No. Patient's initial sepsis screen is negative. Does the patient have a          

      suspected source of infection? No. Patient's initial sepsis screen is negative. Risk        

      Assessment: Do you want to hurt yourself or someone else? Patient reports no desire to      

      harm self or others. Onset of symptoms was July 15, 2021.                                   

04:41 Method Of Arrival: Ambulatory                                                           ak2 

04:41 Acuity: ADRIANNA 3                                                                           ak2 

                                                                                                  

Triage Assessment:                                                                                

04:43 General: Appears in no apparent distress. Behavior is calm, cooperative. Pain:          ak2 

      Complains of pain in chest. Neuro: No deficits noted. Cardiovascular: Rhythm is sinus       

      bradycardia. Respiratory: No deficits noted.                                                

                                                                                                  

Historical:                                                                                       

- Allergies:                                                                                      

04:43 Aspirin;                                                                                ak2 

04:43 NSAIDS;                                                                                 ak2 

- Home Meds:                                                                                      

04:43 aspirin 81 mg Oral chew 1 tab once daily [Active]; brimonidine 0.2% sol fraga 2 drops  ak2 

      to each eye twice a day [Active]; Centrum Oral [Active]; dicyclomine 20 mg Oral tab as      

      needed [Active]; finasteride 5 mg Oral tab 1 tab once daily [Active]; gabapentin 600 mg     

      Oral tab as needed [Active]; lansoprozole 30 mg as needed [Active]; metformin 500 mg        

      Oral tr24 1 tab twice a day [Active]; Probiotic Oral [Active]; Repatha SureClick 140        

      mg/mL subcutaneous pnij 3 mL once moly [Active]; tamsulosin 0.4 mg Oral cp24 1 cap          

      Every other day [Active]; telmisartan-amlodipine 80-10 mg Oral tab 1 tab once daily         

      [Active]; tindol maleate [Active]; zolpidem 10 mg Oral tab 1 tab as needed [Active];        

- PMHx:                                                                                           

04:43 Diverticulitis; GERD; Glaucoma; Hypertension; Kidney stones; prediabetes;               ak2 

- PSHx:                                                                                           

04:43 heart stent;                                                                            ak2 

                                                                                                  

- Immunization history:: Adult Immunizations up to date, Client reports receiving the             

  2nd dose of the Covid vaccine.                                                                  

- Social history:: Smoking status: unknown.                                                       

                                                                                                  

                                                                                                  

Screenin:45 Abuse screen: Denies threats or abuse. Denies injuries from another. Nutritional        ak2 

      screening: No deficits noted. Tuberculosis screening: No symptoms or risk factors           

      identified. Fall Risk None identified.                                                      

                                                                                                  

Assessment:                                                                                       

04:44 Pain: Complains of pain in chest Pain does not radiate. Pain: Pain began 2-3 days ago.  ak2 

      Neuro: No deficits noted. Cardiovascular: No deficits noted. Respiratory: No deficits       

      noted.                                                                                      

05:43 Reassessment: Patient and/or family updated on plan of care and expected duration. Pain ak2 

      level reassessed.                                                                           

06:58 Reassessment: Patient and/or family updated on plan of care and expected duration. Pain ak2 

      level reassessed.                                                                           

07:32 Reassessment: Pt left for Cath lab.                                                     kg  

10:06 Reassessment: Pt returned from Cath lab. No interventions preformed. Pt on bedrest till kg  

      10:30. Pt has incision to right groin. No bleeding or swelling noted. Pt complaining of     

      right hip pain which he describes as chronic. .                                             

10:15 Reassessment: Pulses present BLE, no signs of bleeding or hematoma. .                   kg  

10:30 Reassessment: Pulses present BLE, no signs of bleeding or hematoma..                    kg  

11:00 Reassessment: Pulses presents BLE, no signs of bleeding or hematoma. .                  kg  

                                                                                                  

Vital Signs:                                                                                      

04:41  / 75; Pulse 52; Resp 16; Temp 97.9; Pulse Ox 98% ; Weight 99.79 kg; Height 5 ft. ak2 

      8 in. (172.72 cm);                                                                          

05:43  / 73; Pulse 51; Resp 20; Pulse Ox 100% on R/A;                                   ak2 

06:55  / 71; Pulse 55; Resp 16; Pulse Ox 98% on R/A;                                    ak2 

10:08  / 86; Pulse 48; Resp 20; Pulse Ox 98% on R/A;                                    kg  

10:15  / 80; Pulse 47; Resp 20; Pulse Ox 98% ;                                          kg  

10:30  / 83; Pulse 49; Resp 20; Pulse Ox 99% on R/A;                                    kg  

10:45  / 77; Pulse 49; Resp 20; Pulse Ox 97% ;                                          kg  

11:00  / 60; Pulse 48; Resp 20; Pulse Ox 98% on R/A;                                    kg  

11:45  / 76; Pulse 50; Resp 20; Pulse Ox 99% on R/A;                                    kg  

12:15  / 67; Pulse 51; Resp 20; Pulse Ox 99% on R/A;                                    kg  

04:41 Body Mass Index 33.45 (99.79 kg, 172.72 cm)                                             ak2 

                                                                                                  

ED Course:                                                                                        

04:14 Patient arrived in ED.                                                                  am4 

04:14 Kirk Pacheco MD is Private Physician.                                                am4 

04:39 Jitendra Vela MD is Attending Physician.                                             ps1 

04:43 Triage completed.                                                                       ak2 

04:45 Patient has correct armband on for positive identification. Cardiac monitor on. Pulse   ak2 

      ox on. NIBP on.                                                                             

04:45 Patient placed in the treatment room, on a stretcher. EKG completed in triage. Results  ak2 

      shown to MD.                                                                                

04:45 No provider procedures requiring assistance completed. Inserted saline lock: 18 gauge   ak2 

      in right antecubital area, using aseptic technique. Patient maintains SpO2 saturation       

      greater than 95% on room air.                                                               

04:50 XRAY Chest (1 view) In Process Unspecified.                                             EDMS

05:43 Bill Gamez is Primary Nurse.                                                      ak2 

05:52 Lars Alexander PA is Hospitalizing Provider.                                            ps1 

06:44 contacted the [facility] [phone] to consult Tin.                       mw2 

07:03 Katharine Le, RN is Primary Nurse.                                                   kg  

07:03 Basic Metabolic Panel Sent.                                                             kg  

                                                                                                  

Administered Medications:                                                                         

No medications were administered                                                                  

                                                                                                  

                                                                                                  

Output:                                                                                           

11:06 Urine: 325ml (Voided); Total: 325ml.                                                    kg  

                                                                                                  

Outcome:                                                                                          

05:52 Decision to Hospitalize by Provider.                                                    ps1 

12:44 Patient left the ED.                                                                    iw  

                                                                                                  

Signatures:                                                                                       

Dispatcher [employer]                           Amanda Zee, RN                     RN   iw                                                   

Jitendra Vela MD MD   ps1                                                  

Owen Pond                            mw2                                                  

Margaret Levy                             am4                                                  

Katharine Le RN                     RN   kg                                                   

Bill Gamez                             ak2                                                  

                                                                                                  

Corrections: (The following items were deleted from the chart)                                    

06:56 06:51 contacted the [facility] [phone] to consult Tin perez              mw2 

                                                                                                  

**************************************************************************************************

## 2021-07-15 NOTE — RAD REPORT
EXAM DESCRIPTION:  RAD - Chest Single View - 7/15/2021 4:50 am

 

CLINICAL HISTORY:  CHEST PAIN

 

COMPARISON:  Chest Pa And Lat (2 Views) dated 7/14/2021; Chest Pa And Lat (2 Views) dated 1/7/2019; C
hest Single View dated 6/24/2018; Chest Pa And Lat (2 Views) dated 1/25/2018

 

FINDINGS:  No evidence of edema or pneumonia. The heart size is within normal limits.No acute osseous
 abnormality. No significant pleural effusions or pneumothorax.

 

IMPRESSION:  No acute cardiopulmonary disease.

## 2021-07-15 NOTE — XMS REPORT
Continuity of Care Document

                            Created on:July 15, 2021



Patient:JASON WHITAKER

Sex:Male

:1950

External Reference #:[id number]





Demographics







                          Address                   [address]



                                                    [place] [address]

 

                          Home Phone                [phone]

 

                          Mobile Phone              [phone]

 

                          Email Address             [email or web address]

 

                          Preferred Language        en

 

                          Marital Status            Unknown

 

                          Scientology Affiliation     Unknown

 

                          Race                      Unknown

 

                          Additional Race(s)        Unavailable



                                                    White

 

                          Ethnic Group              Unknown









Author







                          Organization              [facility]

t

 

                          Address                   [address] [place] [address] [address]



                                                    [place] [address]

 

                          Phone                     [phone]









Support







                Name            Relationship    Address         Phone

 

                Jesus         Spouse          [address]   [phone]



                                                [place] [address] 









Care Team Providers







                    Name                Role                Phone

 

                    ISAURO Ponce      Attending Clinician [phone]

 

                    [facility],  [facility]     Attending Clinician Unavailable









Problems

This patient has no known problems.



Allergies, Adverse Reactions, Alerts

This patient has no known allergies or adverse reactions.



Medications

This patient has no known medications.



Procedures

This patient has no known procedures.



Encounters







        Start   End     Encounter Admission Attending Care    Care    Encounter 

Source



        Date/Time Date/Time Type    Type    Clinicians Facility Department ID   

   

 

        2021 Telephone         [facility]    [phone]

[id number] 



        00:00:00 00:00:00                 [address] [facility]  [id number]        

 



                                                [place] [id number]         



                                                [place] [phone]         



                                                nal     [id number]             



                                                Office                  



                                                Building                 



                                                One                     

 

        2021 Laboratory         Lab, [facility]    [id number]

[id number] 



        16:07:53 16:27:53 Only            [facility]  [id number]         



                                                [place] [id number]         



                                                [place] [phone]         



                                                nal     [id number]             



                                                Office                  



                                                Building                 



                                                One                     







Results

This patient has no known results.

## 2021-07-15 NOTE — P.SSS
Patient History


Date of Service: 07/15/21


Reason for admission: Chest pain.


History of Present Illness: 


70-year-old man with a history of gastroesophageal reflux disease, hiatal 

hernia, history of sick dysphagia stricture status post dilatation, history of 

GI bleed presented to the emergency department with a complaint of chest pain of

3 days duration.  Patient stated he was supposed to have a stress test done but 

insurance was delaying the stress test approval.  He was very concerned about 

the chest pain due to heart attach and therefore presented to the emergency 

department for evaluation.  Initial troponin negative.  Chest x-ray showed no 

acute cardiopulmonary disease.  Patient hospitalized for further management.





Allergies





No Known Allergies Allergy (Unverified 09/10/12 11:36)


   





Home Medications: 








Tamsulosin [Flomax*] 0.4 mg PO SEECOM 10/06/17 


Zolpidem Tartrate 10 mg PO BEDTIME PRN 10/06/17 


Pantoprazole [Protonix Tab*] 40 mg PO DAILY #30 tab 10/07/17 


Telmisartan/Amlodipine [Telmisartan-Amlodipine 80-10] 1 tab PO DAILY 06/24/18 


Timolol 0.5% Opth [Timoptic 0.5% Opth*] 1 drop EACH EYE BID 06/24/18 








- Past Medical/Surgical History


Diabetic: No


-: History of diverticulitis


-: GERD with hiatal hernia


-: Hypertension


-: Glaucoma


-: Nephrolithiasis


-: BPH


-: Coronary artery disease, previous stent


-: cardiac stent


Psychosocial/ Personal History: The patient is .





- Family History


  ** Mother


-: Other (see notes)


Notes: Glaucoma, Crohns disease





- Social History


Smoking Status: Never smoker


Alcohol use: No


CD- Drugs: No


Caffeine use: Yes





Review of Systems


Other: 


Except as documented, all other systems reviewed and negative.








Physical Examination





- Vital Signs


Temperature: 97.1 F


Blood Pressure: 147/69


Pulse: 47


Respirations: 14


Pulse Ox (%): 97





- Physical Exam


General: Alert, In no apparent distress, Oriented x3


HEENT: Atraumatic, PERRLA, Mucous membr. moist/pink, EOMI, Sclerae nonicteric


Neck: Supple, JVD not distended


Respiratory: Clear to auscultation bilaterally, Normal air movement


Cardiovascular: No edema, Regular rate/rhythm, Normal S1 S2


Capillary refill: <2 Seconds


Gastrointestinal: Normal bowel sounds, Soft and benign, Non-distended, No t

enderness


Musculoskeletal: No swelling, No tenderness


Integumentary: No rashes, No erythema


Neurological: Normal speech, Normal strength at 5/5 x4 extr, Cranial nerves 3-12

intact


Lymphatics: No axilla or inguinal lymphadenopathy





- Studies


Laboratory Data (last 24 hrs)





07/15/21 04:40: WBC Cancelled, Hgb Cancelled, Hct Cancelled, Plt Count Cancelled


07/15/21 04:40: Sodium Cancelled, Potassium Cancelled, BUN Cancelled, Creatinine

Cancelled, Glucose Cancelled, Magnesium Cancelled, Total Bilirubin Cancelled, 

AST Cancelled, ALT Cancelled, Alkaline Phosphatase Cancelled


07/15/21 04:37: PT 14.2 H, INR 1.23


07/15/21 04:37: WBC 4.50  D, Hgb 14.6, Hct 43.8, Plt Count 234


07/15/21 04:37: Sodium 139, Potassium 4.0, BUN 14, Creatinine 0.95, Glucose 108 

H, Magnesium 2.1, Total Bilirubin 0.4, AST 31, ALT 52, Alkaline Phosphatase 56








- Diagnosis (Problem(s))


(1) Chest pain


Status: Acute   





(2) History of GI bleed


Status: Acute   





(3) Hiatal hernia with GERD


Status: Chronic   





(4) Hypertension


Onset Date: 06/25/18   Status: Chronic   





(5) Obesity


Onset Date: 06/25/18   Status: Chronic   


Treatment Summary: 


Patient placed under observation.


He was seen and evaluated by cardiology Dr. Castle will performed cardiac 

catheterization.  Cardiac catheterization revealed moderate coronary artery 

disease, no significant occlusion to warrant angioplasty or stent.  Patient 

deemed stable for discharge per cardiology.  Patient has significant GI issues. 

He report history of GI bleed from varices, history of esophageal dilatation, 

peptic ulcer disease and so cannot take aspirin.  His chest discomfort likely 

related to GI issues.  His gastroenterology Dr. Edgar contacted to evaluate 

patient and he recommend follow up with him after discharge today.  Patient was 

informed to follow with Dr. Edgar today after discharge.





- Disposition


Condition: FAIR


Diet: AHA


Activity: Ad jimbo


Time Spent Managing Pts Care (In Minutes): 87

## 2021-07-15 NOTE — CON
Date of Consultation:  07/15/2021



Reason For Admission:  Unstable angina.



History Of Present Illness:  Mr. Lee is a 70-year-old white male with history of coronary artery 
disease status post stent of the LAD in 2009, diabetes, hypertension, gastroesophageal reflux disease
.  Came in with substernal chest pressure radiating to the jaw, to the left arm with diaphoresis, chirag
rtness of breath, nausea.  Symptoms were exertional.  Denied PND, orthopnea, pedal edema, palpitation
s, or syncope.  Has a normal EKG and normal troponin.  His creatinine was normal as well.



Past Medical History:  As stated above.



Allergies:  HE IS ALLERGIC TO ASPIRIN AND NONSTEROIDAL ANTI-INFLAMMATORY AGENT.



Review of Systems:

Negative.



Social History:  Negative.



Family History:  Negative.



Medications:  At home include aspirin, Protonix, Flomax, and telmisartan with amlodipine.



Physical Examination:

Vital Signs:  Stable, afebrile. 

HEENT:  Negative. 

Neck:  Supple without any bruit, lymphadenopathy, JVD, or thyromegaly. 

Chest:  Clear to auscultation and percussion. 

Cardiac:  Revealed a regular rhythm and rate.  No murmurs, gallops, or rubs. 

Abdomen:  Benign. 

Extremities:  Revealed no clubbing, cyanosis, or edema.



Diagnostic Data:  Normal.



Impression And Plan:  Symptoms classic for unstable angina and the patient with history of coronary a
rtery disease.  Plan to do a heart catheterization on him today to define his coronary anatomy.  His 
other problems seem to be stable at this point that include diabetes, hypertension, and gastroesophag
eal reflux disease.  We will see what his catheterization shows before making further decisions.





NB/PHIL

DD:  07/15/2021 10:14:26Voice ID:  [id number]

DT:  07/15/2021 12:49:43Report ID:  [id number]

## 2021-07-15 NOTE — OP
Surgeon:  Fantasma Castle MD



Assistant:  Mr. Roman Zabala.



Admitted to the emergency room emergently for unstable angina and was brought to the cath lab on 07/1
5/2021 at 0830 in the morning.



Indication:  Unstable angina and history of LAD stent in the past.



Procedure In Detail:  Mr. Lee is 70.  Brought to the cath lab as an inpatient from the emergency 
room, prepped and draped in the routine sterile fashion.  Given Versed for sedation with fentanyl and
 6-Yi sheath introduced in the right common femoral artery successfully.  StarClose was used to c
lose the case.  Jonh catheter left and right were used to cannulate the left main and right main r
espectively.  He was found to have some moderate plaquing in the distal LAD, minor plaquing in the di
stal RCA.  His stent proximal was widely patent in the LAD.  His circumflex system was normal.  He ha
d about a 40% to 50% stenosis in the mid size ramus.  There were no complications.



Blood Loss:  5 mL.



Postoperative Diagnosis:  Moderate coronary artery disease, patent LAD stent.



Plan:  Plan is for medical therapy.



Anesthesia:  Total conscious sedation was 45 minutes.





NB/MODL

DD:  07/15/2021 08:39:40Voice ID:  [id number]

DT:  07/15/2021 13:15:58Report ID:  [id number]

## 2021-07-16 NOTE — EKG
Test Date:    2021-07-15               Test Time:    04:28:22

Technician:                                          

                                                     

MEASUREMENT RESULTS:                                       

Intervals:                                           

Rate:         53                                     

ND:           154                                    

QRSD:         90                                     

QT:           440                                    

QTc:          412                                    

Axis:                                                

P:            66                                     

ND:           154                                    

QRS:          -5                                     

T:            22                                     

                                                     

INTERPRETIVE STATEMENTS:                                       

                                                     

Sinus bradycardia

Otherwise normal ECG

Compared to ECG 06/25/2018 22:13:44

No significant changes



Electronically Signed On 07-16-21 11:42:31 CDT by Fantasma Castle

## 2021-08-05 ENCOUNTER — HOSPITAL ENCOUNTER (OUTPATIENT)
Dept: HOSPITAL 97 - OR | Age: 71
Discharge: HOME | End: 2021-08-05
Attending: SURGERY
Payer: COMMERCIAL

## 2021-08-05 VITALS — DIASTOLIC BLOOD PRESSURE: 66 MMHG | SYSTOLIC BLOOD PRESSURE: 119 MMHG

## 2021-08-05 VITALS — TEMPERATURE: 97.3 F

## 2021-08-05 VITALS — OXYGEN SATURATION: 97 %

## 2021-08-05 DIAGNOSIS — R13.10: ICD-10-CM

## 2021-08-05 DIAGNOSIS — Z20.822: ICD-10-CM

## 2021-08-05 DIAGNOSIS — K21.9: Primary | ICD-10-CM

## 2021-08-05 DIAGNOSIS — R10.13: ICD-10-CM

## 2021-08-05 DIAGNOSIS — K44.9: ICD-10-CM

## 2021-08-05 DIAGNOSIS — K29.50: ICD-10-CM

## 2021-08-05 LAB
ALBUMIN SERPL BCP-MCNC: 3.8 G/DL (ref 3.4–5)
ALP SERPL-CCNC: 69 U/L (ref 45–117)
ALT SERPL W P-5'-P-CCNC: 40 U/L (ref 12–78)
AST SERPL W P-5'-P-CCNC: 16 U/L (ref 15–37)
BUN BLD-MCNC: 10 MG/DL (ref 7–18)
GLUCOSE SERPLBLD-MCNC: 105 MG/DL (ref 74–106)
HCT VFR BLD CALC: 44.7 % (ref 39.6–49)
LYMPHOCYTES # SPEC AUTO: 1 K/UL (ref 0.7–4.9)
PMV BLD: 8.3 FL (ref 7.6–11.3)
POTASSIUM SERPL-SCNC: 4.3 MMOL/L (ref 3.5–5.1)
RBC # BLD: 4.84 M/UL (ref 4.33–5.43)

## 2021-08-05 PROCEDURE — 80053 COMPREHEN METABOLIC PANEL: CPT

## 2021-08-05 PROCEDURE — 0DB98ZX EXCISION OF DUODENUM, VIA NATURAL OR ARTIFICIAL OPENING ENDOSCOPIC, DIAGNOSTIC: ICD-10-PCS

## 2021-08-05 PROCEDURE — 0DB48ZX EXCISION OF ESOPHAGOGASTRIC JUNCTION, VIA NATURAL OR ARTIFICIAL OPENING ENDOSCOPIC, DIAGNOSTIC: ICD-10-PCS

## 2021-08-05 PROCEDURE — 85025 COMPLETE CBC W/AUTO DIFF WBC: CPT

## 2021-08-05 PROCEDURE — 88305 TISSUE EXAM BY PATHOLOGIST: CPT

## 2021-08-05 PROCEDURE — 36415 COLL VENOUS BLD VENIPUNCTURE: CPT

## 2021-08-05 PROCEDURE — 43239 EGD BIOPSY SINGLE/MULTIPLE: CPT

## 2021-08-05 PROCEDURE — 88312 SPECIAL STAINS GROUP 1: CPT

## 2021-08-05 PROCEDURE — 0DB68ZX EXCISION OF STOMACH, VIA NATURAL OR ARTIFICIAL OPENING ENDOSCOPIC, DIAGNOSTIC: ICD-10-PCS

## 2021-08-05 PROCEDURE — 0DB78ZX EXCISION OF STOMACH, PYLORUS, VIA NATURAL OR ARTIFICIAL OPENING ENDOSCOPIC, DIAGNOSTIC: ICD-10-PCS

## 2021-08-05 NOTE — ENDO RPT
[address], [address]





EGD PROCEDURE REPORT     EXAM DATE: 08/05/2021



PATIENT NAME:          Marlen Lee          MR#:       [id number]

YOB: 1950     VISIT #:     [id number]

ATTENDING:     Isiah Crow DR     STATUS:     outpatient

ASSISTANT:      Barbara Walton RN and Jess Hahn RN



INDICATIONS:  The patient is a 70 yr old Male here for an EGD due to GERD



PROCEDURE PERFORMED:     EGD with biopsy for H.  pylori

MEDICATIONS:     Per Anesthesia.

TOPICAL ANESTHETIC:     none



CONSENT:  The patient understands the risks and benefits of the procedure and

understands that these risks include, but are not limited to: sedation,

allergic reaction, infection, perforation and/or bleeding. Alternative means of

evaluation and treatment include, among others: physical exam, x-rays, and/or

surgical intervention. The patient elects to proceed with this endoscopic

procedure.



DESCRIPTION OF PROCEDURE:  During intra-op preparation period all mechanical 

medical equipment was checked for proper function. Hand hygiene and appropriate

measures for infection prevention was taken.  Procedure, possible

complications, and alternatives including but not limited to the possibility of

bleeding, perforation, tear, infection, sepsis, need for surgery, need for

blood transfusion, and anesthesia related complications were explained to the

patient.  After the risks, benefits and alternatives of the procedure were

thoroughly explained, Informed consent was verified, confirmed and timeout was

successfully executed by the treatment team. The patient was  placed in the

left lateral position.  The patient was anesthetized with topical anesthesia.

Through the anesthetized oropharyngeal area, the scope was passed without any

difficulty.  The EG-[id number] ([id number]) endoscope was introduced through the mouth

and advanced to the second portion of the duodenum.  Retroflexed views revealed

a small hiatal hernia.  The gastroscope was then slowly withdrawn and removed.



Bile reflux was found in the body and the antrum of the stomach.  Multiple

biopsies were obtained and sent to pathology.   The esophagus and

gastroesophageal junction were completely normal in appearance.   Mild

gastritis was found in the body and the antrum of the stomach.  A biopsy for H.

pylori was taken.   A small hiatal hernia was found Located 39 cm from the

point of entry.  With standard forceps, a biopsy was obtained and sent to

pathology.







ADVERSE EVENTS:     There were no complications.

IMPRESSIONS:     1.  Bile reflux was found in the body and the antrum of the

stomach

2.  Normal esophagus

3.  Mild gastritis was found in the body and the antrum of the stomach

4.  A small hiatal hernia was found



RECOMMENDATIONS:

REPEAT EXAM:





___________________________________

Isiah Crow DR

eSigned:  Isiah Crow DR 08/05/2021 12:49 PM





cc:



CPT CODES:

ICD9 CODES:





PATIENT NAME:  Marlen Lee

MR#: [id number]